# Patient Record
Sex: MALE | Race: WHITE | NOT HISPANIC OR LATINO
[De-identification: names, ages, dates, MRNs, and addresses within clinical notes are randomized per-mention and may not be internally consistent; named-entity substitution may affect disease eponyms.]

---

## 2018-03-20 ENCOUNTER — APPOINTMENT (OUTPATIENT)
Dept: ORTHOPEDIC SURGERY | Facility: CLINIC | Age: 63
End: 2018-03-20

## 2022-09-17 ENCOUNTER — APPOINTMENT (OUTPATIENT)
Dept: ORTHOPEDIC SURGERY | Facility: CLINIC | Age: 67
End: 2022-09-17

## 2022-12-23 ENCOUNTER — NON-APPOINTMENT (OUTPATIENT)
Age: 67
End: 2022-12-23

## 2022-12-29 ENCOUNTER — APPOINTMENT (OUTPATIENT)
Dept: ORTHOPEDIC SURGERY | Facility: CLINIC | Age: 67
End: 2022-12-29

## 2023-07-20 ENCOUNTER — APPOINTMENT (OUTPATIENT)
Dept: ORTHOPEDIC SURGERY | Facility: CLINIC | Age: 68
End: 2023-07-20
Payer: MEDICARE

## 2023-07-20 VITALS
WEIGHT: 220 LBS | HEIGHT: 72 IN | HEART RATE: 78 BPM | BODY MASS INDEX: 29.8 KG/M2 | DIASTOLIC BLOOD PRESSURE: 78 MMHG | SYSTOLIC BLOOD PRESSURE: 148 MMHG

## 2023-07-20 PROCEDURE — 73502 X-RAY EXAM HIP UNI 2-3 VIEWS: CPT

## 2023-07-20 PROCEDURE — 99214 OFFICE O/P EST MOD 30 MIN: CPT

## 2023-07-20 NOTE — REASON FOR VISIT
[Initial Visit] : an initial visit for [Hip Pain] : hip pain [Joint Replacement Surgery, Aftercare] : joint replacement surgery, aftercare

## 2023-07-24 ENCOUNTER — NON-APPOINTMENT (OUTPATIENT)
Age: 68
End: 2023-07-24

## 2023-07-24 RX ORDER — OXYCODONE 5 MG/1
5 TABLET ORAL
Qty: 30 | Refills: 0 | Status: ACTIVE | COMMUNITY
Start: 2023-07-24 | End: 1900-01-01

## 2023-07-24 NOTE — HISTORY OF PRESENT ILLNESS
[Worsening] : worsening [de-identified] : Patient is a 67-year-old male who presents today for an evaluation regarding his right hip.  He is status post right total hip replacement by Dr. Lee in 2009.  He states that following surgery he did very well without any complaints of any pain or discomfort.  However over the years he has noticed an increase in discomfort.  Particularly within the last several months.  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  And due to complications of the metal-on-metal implants.  Close monitoring as well as a possible need for revision may ensue.  He states that the pain is significant.  And is a 10 on a scale of 1-10 but most times at 8.  He states this is worse when laying on his side.  Or simply just ambulating.  He states that he does feel a stiffness and a discomfort.  On the lateral aspect but does radiate into the groin.  He denies any specific injury or accident.  States that he has tried Tylenol without any pain relief.

## 2023-07-24 NOTE — DISCUSSION/SUMMARY
[de-identified] : After thorough history full examination and review of x-rays.  Patient was explained that he does have a metal-on-metal implant with significant discomfort radiating into his groin.  Although he does have some lateral tenderness along the ITB/GT B.  Much of his discomfort radiates into the groin.  With further warrants further diagnostic testing.  This is a MRI to further evaluate the prosthesis as well as osteolysis and for any surrounding tissue.  He is also advised to receive blood work.  To rule out any infectious/inflammatory process as well as metal ions.  The blood work includes CBC with differential, BMP, sed rate, C-reactive protein, chromium cobalt and nickel ions.  He is advised to continue with conservative management as he is presently doing.  Including ice packs/moist heat and anti-inflammatories with some exercise routine.  But advised to return back to the office once the MRI and blood work is performed to discuss its findings and further medical management.\par \par 35 minutes were spent, face to face, in direct consultation with the patient. This includes reviewing the natural history of their Dx., eliciting the history, performing an orthopedic exam, review of the x-ray findings, forming a differential Dx and discussing all treatment options. This Includes both surgical and non-surgical treatments. I also reviewed all the risks and benefits of non-operative & operative Tx options, future impact into orthopedic functions/problems, activity restrictions both at home and at work, and all follow up requirements.\par \par \par

## 2023-07-24 NOTE — PHYSICAL EXAM
[Antalgic] : antalgic [LE] : Sensory: Intact in bilateral lower extremities [ALL] : dorsalis pedis, posterior tibial, femoral, popliteal, and radial 2+ and symmetric bilaterally [de-identified] : On physical examination of the right hip.  Patient is status post right total hip replacement from the posterior approach.  There is a well-healed surgical scars with no evidence of infection superficially deep.  There is no redness swelling heat or discharge noted.  However there is pain and tenderness on the lateral aspect of the hip which does radiate into the groin.  This is worse with any attempts of range of motion in any planes.  Particularly more so with internal and external rotation.  There is no evidence of any muscle atrophy.  No evidence of any muscle motor or sensory deficit.  Patient has good distal pulses no calf tenderness.  No evidence of limb length discrepancy. [de-identified] : X-rays of the right hip reveal status post right total hip replacement with a metal-on-metal implant.  The hardware is in place and shows good alignment with fixation.  No evidence of any fracture or dislocation.

## 2023-07-28 RX ORDER — OXYCODONE 5 MG/1
5 TABLET ORAL
Qty: 30 | Refills: 0 | Status: ACTIVE | COMMUNITY
Start: 2023-07-28 | End: 1900-01-01

## 2023-08-16 RX ORDER — OXYCODONE 5 MG/1
5 TABLET ORAL
Qty: 30 | Refills: 0 | Status: ACTIVE | COMMUNITY
Start: 2023-08-16 | End: 1900-01-01

## 2023-09-06 RX ORDER — OXYCODONE 5 MG/1
5 TABLET ORAL
Qty: 28 | Refills: 0 | Status: ACTIVE | COMMUNITY
Start: 2023-08-07 | End: 1900-01-01

## 2023-09-19 RX ORDER — OXYCODONE 5 MG/1
5 TABLET ORAL
Qty: 21 | Refills: 0 | Status: ACTIVE | COMMUNITY
Start: 2023-09-19 | End: 1900-01-01

## 2023-09-29 RX ORDER — OXYCODONE 5 MG/1
5 TABLET ORAL
Qty: 16 | Refills: 0 | Status: ACTIVE | COMMUNITY
Start: 2023-09-29 | End: 1900-01-01

## 2023-10-10 ENCOUNTER — APPOINTMENT (OUTPATIENT)
Dept: ORTHOPEDIC SURGERY | Facility: CLINIC | Age: 68
End: 2023-10-10
Payer: MEDICARE

## 2023-10-10 PROCEDURE — 99214 OFFICE O/P EST MOD 30 MIN: CPT

## 2023-11-02 RX ORDER — OXYCODONE 5 MG/1
5 TABLET ORAL
Qty: 40 | Refills: 0 | Status: ACTIVE | COMMUNITY
Start: 2023-07-20 | End: 1900-01-01

## 2023-11-03 ENCOUNTER — APPOINTMENT (OUTPATIENT)
Dept: ULTRASOUND IMAGING | Facility: CLINIC | Age: 68
End: 2023-11-03
Payer: MEDICARE

## 2023-11-03 PROCEDURE — 20611 DRAIN/INJ JOINT/BURSA W/US: CPT | Mod: RT

## 2023-11-07 LAB
ANION GAP SERPL CALC-SCNC: 11 MMOL/L
BUN SERPL-MCNC: 35 MG/DL
CALCIUM SERPL-MCNC: 11.7 MG/DL
CHLORIDE SERPL-SCNC: 97 MMOL/L
CO2 SERPL-SCNC: 31 MMOL/L
COBALT: 7.5 UG/L
CREAT SERPL-MCNC: 1.96 MG/DL
CRP SERPL-MCNC: <3 MG/L
EGFR: 37 ML/MIN/1.73M2
ERYTHROCYTE [SEDIMENTATION RATE] IN BLOOD BY WESTERGREN METHOD: 12 MM/HR
GLUCOSE SERPL-MCNC: 94 MG/DL
NICKEL: 6.8 UG/L
POTASSIUM SERPL-SCNC: 4.6 MMOL/L
SODIUM SERPL-SCNC: 140 MMOL/L

## 2023-11-08 LAB — CR BLD-MCNC: 7.6 UG/L

## 2023-11-10 RX ORDER — OXYCODONE 5 MG/1
5 TABLET ORAL EVERY 4 HOURS
Qty: 42 | Refills: 0 | Status: ACTIVE | COMMUNITY
Start: 2023-10-10 | End: 1900-01-01

## 2023-11-14 ENCOUNTER — APPOINTMENT (OUTPATIENT)
Dept: ORTHOPEDIC SURGERY | Facility: CLINIC | Age: 68
End: 2023-11-14
Payer: MEDICARE

## 2023-11-14 VITALS — SYSTOLIC BLOOD PRESSURE: 159 MMHG | DIASTOLIC BLOOD PRESSURE: 89 MMHG | HEART RATE: 83 BPM

## 2023-11-14 DIAGNOSIS — T84.84XA PAIN DUE TO INTERNAL ORTHOPEDIC PROSTHETIC DEVICES, IMPLANTS AND GRAFTS, INITIAL ENCOUNTER: ICD-10-CM

## 2023-11-14 PROCEDURE — 99214 OFFICE O/P EST MOD 30 MIN: CPT

## 2023-11-27 RX ORDER — OXYCODONE 5 MG/1
5 TABLET ORAL
Qty: 30 | Refills: 0 | Status: ACTIVE | COMMUNITY
Start: 2023-11-15 | End: 1900-01-01

## 2023-11-29 ENCOUNTER — OUTPATIENT (OUTPATIENT)
Dept: OUTPATIENT SERVICES | Facility: HOSPITAL | Age: 68
LOS: 1 days | End: 2023-11-29
Payer: MEDICARE

## 2023-11-29 VITALS
RESPIRATION RATE: 16 BRPM | WEIGHT: 212.08 LBS | DIASTOLIC BLOOD PRESSURE: 90 MMHG | HEART RATE: 78 BPM | SYSTOLIC BLOOD PRESSURE: 151 MMHG | TEMPERATURE: 98 F | OXYGEN SATURATION: 100 % | HEIGHT: 71 IN

## 2023-11-29 DIAGNOSIS — Z96.641 PRESENCE OF RIGHT ARTIFICIAL HIP JOINT: ICD-10-CM

## 2023-11-29 DIAGNOSIS — Z96.641 PRESENCE OF RIGHT ARTIFICIAL HIP JOINT: Chronic | ICD-10-CM

## 2023-11-29 DIAGNOSIS — T84.84XA PAIN DUE TO INTERNAL ORTHOPEDIC PROSTHETIC DEVICES, IMPLANTS AND GRAFTS, INITIAL ENCOUNTER: ICD-10-CM

## 2023-11-29 DIAGNOSIS — Z01.818 ENCOUNTER FOR OTHER PREPROCEDURAL EXAMINATION: ICD-10-CM

## 2023-11-29 DIAGNOSIS — Z90.49 ACQUIRED ABSENCE OF OTHER SPECIFIED PARTS OF DIGESTIVE TRACT: Chronic | ICD-10-CM

## 2023-11-29 DIAGNOSIS — Z98.890 OTHER SPECIFIED POSTPROCEDURAL STATES: Chronic | ICD-10-CM

## 2023-11-29 LAB
ALBUMIN SERPL ELPH-MCNC: 4.4 G/DL — SIGNIFICANT CHANGE UP (ref 3.3–5)
ALBUMIN SERPL ELPH-MCNC: 4.4 G/DL — SIGNIFICANT CHANGE UP (ref 3.3–5)
ALP SERPL-CCNC: 39 U/L — SIGNIFICANT CHANGE UP (ref 30–120)
ALP SERPL-CCNC: 39 U/L — SIGNIFICANT CHANGE UP (ref 30–120)
ALT FLD-CCNC: 177 U/L — HIGH (ref 10–60)
ALT FLD-CCNC: 177 U/L — HIGH (ref 10–60)
ANION GAP SERPL CALC-SCNC: 9 MMOL/L — SIGNIFICANT CHANGE UP (ref 5–17)
ANION GAP SERPL CALC-SCNC: 9 MMOL/L — SIGNIFICANT CHANGE UP (ref 5–17)
APTT BLD: 32.9 SEC — SIGNIFICANT CHANGE UP (ref 24.5–35.6)
APTT BLD: 32.9 SEC — SIGNIFICANT CHANGE UP (ref 24.5–35.6)
AST SERPL-CCNC: 289 U/L — HIGH (ref 10–40)
AST SERPL-CCNC: 289 U/L — HIGH (ref 10–40)
BILIRUB SERPL-MCNC: 1.2 MG/DL — SIGNIFICANT CHANGE UP (ref 0.2–1.2)
BILIRUB SERPL-MCNC: 1.2 MG/DL — SIGNIFICANT CHANGE UP (ref 0.2–1.2)
BLD GP AB SCN SERPL QL: SIGNIFICANT CHANGE UP
BLD GP AB SCN SERPL QL: SIGNIFICANT CHANGE UP
BUN SERPL-MCNC: 30 MG/DL — HIGH (ref 7–23)
BUN SERPL-MCNC: 30 MG/DL — HIGH (ref 7–23)
CALCIUM SERPL-MCNC: 9.3 MG/DL — SIGNIFICANT CHANGE UP (ref 8.4–10.5)
CALCIUM SERPL-MCNC: 9.3 MG/DL — SIGNIFICANT CHANGE UP (ref 8.4–10.5)
CHLORIDE SERPL-SCNC: 95 MMOL/L — LOW (ref 96–108)
CHLORIDE SERPL-SCNC: 95 MMOL/L — LOW (ref 96–108)
CO2 SERPL-SCNC: 28 MMOL/L — SIGNIFICANT CHANGE UP (ref 22–31)
CO2 SERPL-SCNC: 28 MMOL/L — SIGNIFICANT CHANGE UP (ref 22–31)
CREAT SERPL-MCNC: 1.51 MG/DL — HIGH (ref 0.5–1.3)
CREAT SERPL-MCNC: 1.51 MG/DL — HIGH (ref 0.5–1.3)
EGFR: 51 ML/MIN/1.73M2 — LOW
EGFR: 51 ML/MIN/1.73M2 — LOW
GLUCOSE SERPL-MCNC: 124 MG/DL — HIGH (ref 70–99)
GLUCOSE SERPL-MCNC: 124 MG/DL — HIGH (ref 70–99)
HCT VFR BLD CALC: 38.7 % — LOW (ref 39–50)
HCT VFR BLD CALC: 38.7 % — LOW (ref 39–50)
HGB BLD-MCNC: 12.8 G/DL — LOW (ref 13–17)
HGB BLD-MCNC: 12.8 G/DL — LOW (ref 13–17)
INR BLD: 1.26 RATIO — HIGH (ref 0.85–1.18)
INR BLD: 1.26 RATIO — HIGH (ref 0.85–1.18)
MCHC RBC-ENTMCNC: 30.5 PG — SIGNIFICANT CHANGE UP (ref 27–34)
MCHC RBC-ENTMCNC: 30.5 PG — SIGNIFICANT CHANGE UP (ref 27–34)
MCHC RBC-ENTMCNC: 33.1 GM/DL — SIGNIFICANT CHANGE UP (ref 32–36)
MCHC RBC-ENTMCNC: 33.1 GM/DL — SIGNIFICANT CHANGE UP (ref 32–36)
MCV RBC AUTO: 92.4 FL — SIGNIFICANT CHANGE UP (ref 80–100)
MCV RBC AUTO: 92.4 FL — SIGNIFICANT CHANGE UP (ref 80–100)
NRBC # BLD: 0 /100 WBCS — SIGNIFICANT CHANGE UP (ref 0–0)
NRBC # BLD: 0 /100 WBCS — SIGNIFICANT CHANGE UP (ref 0–0)
PLATELET # BLD AUTO: 268 K/UL — SIGNIFICANT CHANGE UP (ref 150–400)
PLATELET # BLD AUTO: 268 K/UL — SIGNIFICANT CHANGE UP (ref 150–400)
POTASSIUM SERPL-MCNC: 4.5 MMOL/L — SIGNIFICANT CHANGE UP (ref 3.5–5.3)
POTASSIUM SERPL-MCNC: 4.5 MMOL/L — SIGNIFICANT CHANGE UP (ref 3.5–5.3)
POTASSIUM SERPL-SCNC: 4.5 MMOL/L — SIGNIFICANT CHANGE UP (ref 3.5–5.3)
POTASSIUM SERPL-SCNC: 4.5 MMOL/L — SIGNIFICANT CHANGE UP (ref 3.5–5.3)
PROT SERPL-MCNC: 7.9 G/DL — SIGNIFICANT CHANGE UP (ref 6–8.3)
PROT SERPL-MCNC: 7.9 G/DL — SIGNIFICANT CHANGE UP (ref 6–8.3)
PROTHROM AB SERPL-ACNC: 14 SEC — HIGH (ref 9.5–13)
PROTHROM AB SERPL-ACNC: 14 SEC — HIGH (ref 9.5–13)
RBC # BLD: 4.19 M/UL — LOW (ref 4.2–5.8)
RBC # BLD: 4.19 M/UL — LOW (ref 4.2–5.8)
RBC # FLD: 13.2 % — SIGNIFICANT CHANGE UP (ref 10.3–14.5)
RBC # FLD: 13.2 % — SIGNIFICANT CHANGE UP (ref 10.3–14.5)
SODIUM SERPL-SCNC: 132 MMOL/L — LOW (ref 135–145)
SODIUM SERPL-SCNC: 132 MMOL/L — LOW (ref 135–145)
WBC # BLD: 7.12 K/UL — SIGNIFICANT CHANGE UP (ref 3.8–10.5)
WBC # BLD: 7.12 K/UL — SIGNIFICANT CHANGE UP (ref 3.8–10.5)
WBC # FLD AUTO: 7.12 K/UL — SIGNIFICANT CHANGE UP (ref 3.8–10.5)
WBC # FLD AUTO: 7.12 K/UL — SIGNIFICANT CHANGE UP (ref 3.8–10.5)

## 2023-11-29 PROCEDURE — G0463: CPT

## 2023-11-29 NOTE — H&P PST ADULT - NSICDXPROCEDURE_GEN_ALL_CORE_FT
PROCEDURES:  First stage revision of total arthroplasty of right hip 29-Nov-2023 15:01:50  Cyndi Barber

## 2023-11-29 NOTE — H&P PST ADULT - NSICDXFAMILYHX_GEN_ALL_CORE_FT
FAMILY HISTORY:  Father  Still living? No  Family history of hyperlipidemia, Age at diagnosis: Age Unknown

## 2023-11-29 NOTE — H&P PST ADULT - NSICDXPASTMEDICALHX_GEN_ALL_CORE_FT
PAST MEDICAL HISTORY:  Hyperlipidemia     Hypertension     Osteoarthritis of right hip     Painful orthopaedic hardware      PAST MEDICAL HISTORY:  Hyperlipidemia     Hypertension     Osteoarthritis of right hip     Painful orthopaedic hardware     Syncopal episodes

## 2023-11-29 NOTE — H&P PST ADULT - NSICDXPASTSURGICALHX_GEN_ALL_CORE_FT
PAST SURGICAL HISTORY:  S/P appendectomy     S/P hernia repair     Status post total replacement of right hip

## 2023-11-29 NOTE — H&P PST ADULT - NEUROLOGICAL COMMENTS
r/t dehydration due to renal disease (last episode was in early 2022) syncopal episodes r/t dehydration (last episode was in early 2022)

## 2023-11-29 NOTE — H&P PST ADULT - ASSESSMENT
68yo male patient scheduled for Revision of RIGHT Total Hip Replacement on 12/11/2023.    66yo male patient scheduled for Revision of RIGHT Total Hip Replacement on 12/11/2023.

## 2023-11-29 NOTE — H&P PST ADULT - HISTORY OF PRESENT ILLNESS
66yo male patient who states that he has Right Total Hip Replacement in 2006. He has been unable to lie on his right side since that time. He has had increased pain since the summer. He has had arthrocentesis several weeks ago, but states that pain is increased since that time. He rates the pain at 9/10 and he is taking Oxycodone and Tylenol prn with minimal relief. Revision has been recommended and he presents today for PSTs.  68yo male patient who states that he has Right Total Hip Replacement in 2006. He has been unable to lie on his right side since that time. He has had increased pain since the summer. He has had arthrocentesis several weeks ago, but states that pain is increased since that time. He rates the pain at 9/10 and he is taking Oxycodone or Tylenol prn with minimal relief. Revision has been recommended and he presents today for PSTs.  66yo male patient who states that he has Right Total Hip Replacement in 2006. He has been unable to lie on his right side since that time. He has had increased pain since the summer. He has had arthrocentesis several weeks ago, but states that pain is increased since that time. He rates the pain at 9/10 and he is taking Oxycodone or Tylenol prn with minimal relief. Revision has been recommended and he presents today for PSTs.

## 2023-11-29 NOTE — H&P PST ADULT - PROBLEM SELECTOR PLAN 1
Single Stage Revision of RIGHT Total Hip Replacement on 12/11/2023.   Medical Clearance by PMD  NPO as per Anesthesia- no meds on AM of surgery  Tylenol or Oxycodone prn if needed   Instructions reviewed and questions addressed  Pt denies any metal allergies.

## 2023-11-30 LAB
A1C WITH ESTIMATED AVERAGE GLUCOSE RESULT: 5.4 % — SIGNIFICANT CHANGE UP (ref 4–5.6)
A1C WITH ESTIMATED AVERAGE GLUCOSE RESULT: 5.4 % — SIGNIFICANT CHANGE UP (ref 4–5.6)
ESTIMATED AVERAGE GLUCOSE: 108 MG/DL — SIGNIFICANT CHANGE UP (ref 68–114)
ESTIMATED AVERAGE GLUCOSE: 108 MG/DL — SIGNIFICANT CHANGE UP (ref 68–114)
MRSA PCR RESULT.: SIGNIFICANT CHANGE UP
MRSA PCR RESULT.: SIGNIFICANT CHANGE UP
S AUREUS DNA NOSE QL NAA+PROBE: DETECTED
S AUREUS DNA NOSE QL NAA+PROBE: DETECTED

## 2023-11-30 RX ORDER — MUPIROCIN 20 MG/G
1 OINTMENT TOPICAL
Qty: 1 | Refills: 0
Start: 2023-11-30 | End: 2023-12-04

## 2023-12-04 RX ORDER — OXYCODONE 5 MG/1
5 TABLET ORAL
Qty: 42 | Refills: 0 | Status: ACTIVE | COMMUNITY
Start: 2023-12-04 | End: 1900-01-01

## 2023-12-05 PROBLEM — I10 ESSENTIAL (PRIMARY) HYPERTENSION: Chronic | Status: ACTIVE | Noted: 2023-11-29

## 2023-12-05 PROBLEM — R55 SYNCOPE AND COLLAPSE: Chronic | Status: ACTIVE | Noted: 2023-11-29

## 2023-12-05 PROBLEM — T84.84XA PAIN DUE TO INTERNAL ORTHOPEDIC PROSTHETIC DEVICES, IMPLANTS AND GRAFTS, INITIAL ENCOUNTER: Chronic | Status: ACTIVE | Noted: 2023-11-29

## 2023-12-05 PROBLEM — M16.11 UNILATERAL PRIMARY OSTEOARTHRITIS, RIGHT HIP: Chronic | Status: ACTIVE | Noted: 2023-11-29

## 2023-12-05 PROBLEM — E78.5 HYPERLIPIDEMIA, UNSPECIFIED: Chronic | Status: ACTIVE | Noted: 2023-11-29

## 2023-12-10 ENCOUNTER — TRANSCRIPTION ENCOUNTER (OUTPATIENT)
Age: 68
End: 2023-12-10

## 2023-12-11 ENCOUNTER — RESULT REVIEW (OUTPATIENT)
Age: 68
End: 2023-12-11

## 2023-12-11 ENCOUNTER — APPOINTMENT (OUTPATIENT)
Dept: ORTHOPEDIC SURGERY | Facility: HOSPITAL | Age: 68
End: 2023-12-11

## 2023-12-11 ENCOUNTER — INPATIENT (INPATIENT)
Facility: HOSPITAL | Age: 68
LOS: 3 days | Discharge: HOME | DRG: 467 | End: 2023-12-15
Attending: ORTHOPAEDIC SURGERY | Admitting: ORTHOPAEDIC SURGERY
Payer: MEDICARE

## 2023-12-11 ENCOUNTER — TRANSCRIPTION ENCOUNTER (OUTPATIENT)
Age: 68
End: 2023-12-11

## 2023-12-11 VITALS
RESPIRATION RATE: 13 BRPM | WEIGHT: 212.53 LBS | HEIGHT: 72 IN | OXYGEN SATURATION: 100 % | DIASTOLIC BLOOD PRESSURE: 74 MMHG | TEMPERATURE: 97 F | HEART RATE: 82 BPM | SYSTOLIC BLOOD PRESSURE: 146 MMHG

## 2023-12-11 DIAGNOSIS — T84.84XA PAIN DUE TO INTERNAL ORTHOPEDIC PROSTHETIC DEVICES, IMPLANTS AND GRAFTS, INITIAL ENCOUNTER: ICD-10-CM

## 2023-12-11 DIAGNOSIS — Z96.641 PRESENCE OF RIGHT ARTIFICIAL HIP JOINT: ICD-10-CM

## 2023-12-11 DIAGNOSIS — Z96.641 PRESENCE OF RIGHT ARTIFICIAL HIP JOINT: Chronic | ICD-10-CM

## 2023-12-11 DIAGNOSIS — Z98.890 OTHER SPECIFIED POSTPROCEDURAL STATES: Chronic | ICD-10-CM

## 2023-12-11 DIAGNOSIS — Z90.49 ACQUIRED ABSENCE OF OTHER SPECIFIED PARTS OF DIGESTIVE TRACT: Chronic | ICD-10-CM

## 2023-12-11 LAB
APTT BLD: 32.5 SEC — SIGNIFICANT CHANGE UP (ref 24.5–35.6)
APTT BLD: 32.5 SEC — SIGNIFICANT CHANGE UP (ref 24.5–35.6)
HCT VFR BLD CALC: 27.9 % — LOW (ref 39–50)
HCT VFR BLD CALC: 27.9 % — LOW (ref 39–50)
HGB BLD-MCNC: 9 G/DL — LOW (ref 13–17)
HGB BLD-MCNC: 9 G/DL — LOW (ref 13–17)
INR BLD: 1.06 RATIO — SIGNIFICANT CHANGE UP (ref 0.85–1.18)
INR BLD: 1.06 RATIO — SIGNIFICANT CHANGE UP (ref 0.85–1.18)
MCHC RBC-ENTMCNC: 31.5 PG — SIGNIFICANT CHANGE UP (ref 27–34)
MCHC RBC-ENTMCNC: 31.5 PG — SIGNIFICANT CHANGE UP (ref 27–34)
MCHC RBC-ENTMCNC: 32.3 GM/DL — SIGNIFICANT CHANGE UP (ref 32–36)
MCHC RBC-ENTMCNC: 32.3 GM/DL — SIGNIFICANT CHANGE UP (ref 32–36)
MCV RBC AUTO: 97.6 FL — SIGNIFICANT CHANGE UP (ref 80–100)
MCV RBC AUTO: 97.6 FL — SIGNIFICANT CHANGE UP (ref 80–100)
NRBC # BLD: 0 /100 WBCS — SIGNIFICANT CHANGE UP (ref 0–0)
NRBC # BLD: 0 /100 WBCS — SIGNIFICANT CHANGE UP (ref 0–0)
PLATELET # BLD AUTO: 226 K/UL — SIGNIFICANT CHANGE UP (ref 150–400)
PLATELET # BLD AUTO: 226 K/UL — SIGNIFICANT CHANGE UP (ref 150–400)
PROTHROM AB SERPL-ACNC: 11.8 SEC — SIGNIFICANT CHANGE UP (ref 9.5–13)
PROTHROM AB SERPL-ACNC: 11.8 SEC — SIGNIFICANT CHANGE UP (ref 9.5–13)
RBC # BLD: 2.86 M/UL — LOW (ref 4.2–5.8)
RBC # BLD: 2.86 M/UL — LOW (ref 4.2–5.8)
RBC # FLD: 13.2 % — SIGNIFICANT CHANGE UP (ref 10.3–14.5)
RBC # FLD: 13.2 % — SIGNIFICANT CHANGE UP (ref 10.3–14.5)
WBC # BLD: 7.83 K/UL — SIGNIFICANT CHANGE UP (ref 3.8–10.5)
WBC # BLD: 7.83 K/UL — SIGNIFICANT CHANGE UP (ref 3.8–10.5)
WBC # FLD AUTO: 7.83 K/UL — SIGNIFICANT CHANGE UP (ref 3.8–10.5)
WBC # FLD AUTO: 7.83 K/UL — SIGNIFICANT CHANGE UP (ref 3.8–10.5)

## 2023-12-11 PROCEDURE — 73502 X-RAY EXAM HIP UNI 2-3 VIEWS: CPT | Mod: 26,RT

## 2023-12-11 PROCEDURE — 88300 SURGICAL PATH GROSS: CPT | Mod: 26,59

## 2023-12-11 PROCEDURE — 27134 REVISE HIP JOINT REPLACEMENT: CPT | Mod: RT

## 2023-12-11 PROCEDURE — 88311 DECALCIFY TISSUE: CPT | Mod: 26

## 2023-12-11 PROCEDURE — 88305 TISSUE EXAM BY PATHOLOGIST: CPT | Mod: 26

## 2023-12-11 DEVICE — IMPLANTABLE DEVICE: Type: IMPLANTABLE DEVICE | Status: FUNCTIONAL

## 2023-12-11 DEVICE — HEAD BIOLOX DELTA 28MM: Type: IMPLANTABLE DEVICE | Status: FUNCTIONAL

## 2023-12-11 DEVICE — INSERT FEM BIOLOX TAPER ADAPTER PLUS 3MM: Type: IMPLANTABLE DEVICE | Status: FUNCTIONAL

## 2023-12-11 DEVICE — BONE FILLER BIOCOMPOSITE STIMULAN RAPID CURE 10CC: Type: IMPLANTABLE DEVICE | Status: FUNCTIONAL

## 2023-12-11 RX ORDER — CHLORHEXIDINE GLUCONATE 213 G/1000ML
1 SOLUTION TOPICAL ONCE
Refills: 0 | Status: COMPLETED | OUTPATIENT
Start: 2023-12-11 | End: 2023-12-11

## 2023-12-11 RX ORDER — ZOLPIDEM TARTRATE 10 MG/1
5 TABLET ORAL AT BEDTIME
Refills: 0 | Status: DISCONTINUED | OUTPATIENT
Start: 2023-12-11 | End: 2023-12-15

## 2023-12-11 RX ORDER — ROSUVASTATIN CALCIUM 5 MG/1
1 TABLET ORAL
Refills: 0 | DISCHARGE

## 2023-12-11 RX ORDER — SENNA PLUS 8.6 MG/1
2 TABLET ORAL AT BEDTIME
Refills: 0 | Status: DISCONTINUED | OUTPATIENT
Start: 2023-12-11 | End: 2023-12-15

## 2023-12-11 RX ORDER — OXYCODONE HYDROCHLORIDE 5 MG/1
10 TABLET ORAL
Refills: 0 | Status: DISCONTINUED | OUTPATIENT
Start: 2023-12-11 | End: 2023-12-13

## 2023-12-11 RX ORDER — OXYCODONE HYDROCHLORIDE 5 MG/1
15 TABLET ORAL
Refills: 0 | Status: DISCONTINUED | OUTPATIENT
Start: 2023-12-11 | End: 2023-12-13

## 2023-12-11 RX ORDER — ONDANSETRON 8 MG/1
4 TABLET, FILM COATED ORAL EVERY 6 HOURS
Refills: 0 | Status: DISCONTINUED | OUTPATIENT
Start: 2023-12-11 | End: 2023-12-15

## 2023-12-11 RX ORDER — FENOFIBRATE,MICRONIZED 130 MG
145 CAPSULE ORAL AT BEDTIME
Refills: 0 | Status: DISCONTINUED | OUTPATIENT
Start: 2023-12-11 | End: 2023-12-15

## 2023-12-11 RX ORDER — SODIUM CHLORIDE 9 MG/ML
500 INJECTION INTRAMUSCULAR; INTRAVENOUS; SUBCUTANEOUS ONCE
Refills: 0 | Status: COMPLETED | OUTPATIENT
Start: 2023-12-11 | End: 2023-12-11

## 2023-12-11 RX ORDER — APREPITANT 80 MG/1
40 CAPSULE ORAL ONCE
Refills: 0 | Status: COMPLETED | OUTPATIENT
Start: 2023-12-11 | End: 2023-12-11

## 2023-12-11 RX ORDER — DEXAMETHASONE 0.5 MG/5ML
8 ELIXIR ORAL ONCE
Refills: 0 | Status: COMPLETED | OUTPATIENT
Start: 2023-12-12 | End: 2023-12-12

## 2023-12-11 RX ORDER — ACETAMINOPHEN 500 MG
500 TABLET ORAL ONCE
Refills: 0 | Status: COMPLETED | OUTPATIENT
Start: 2023-12-11 | End: 2023-12-11

## 2023-12-11 RX ORDER — HYDROMORPHONE HYDROCHLORIDE 2 MG/ML
0.5 INJECTION INTRAMUSCULAR; INTRAVENOUS; SUBCUTANEOUS
Refills: 0 | Status: DISCONTINUED | OUTPATIENT
Start: 2023-12-11 | End: 2023-12-15

## 2023-12-11 RX ORDER — CELECOXIB 200 MG/1
100 CAPSULE ORAL EVERY 12 HOURS
Refills: 0 | Status: DISCONTINUED | OUTPATIENT
Start: 2023-12-11 | End: 2023-12-12

## 2023-12-11 RX ORDER — SODIUM CHLORIDE 9 MG/ML
1000 INJECTION, SOLUTION INTRAVENOUS
Refills: 0 | Status: DISCONTINUED | OUTPATIENT
Start: 2023-12-12 | End: 2023-12-15

## 2023-12-11 RX ORDER — ACETAMINOPHEN 500 MG
500 TABLET ORAL EVERY 8 HOURS
Refills: 0 | Status: DISCONTINUED | OUTPATIENT
Start: 2023-12-12 | End: 2023-12-15

## 2023-12-11 RX ORDER — MAGNESIUM HYDROXIDE 400 MG/1
30 TABLET, CHEWABLE ORAL DAILY
Refills: 0 | Status: DISCONTINUED | OUTPATIENT
Start: 2023-12-11 | End: 2023-12-15

## 2023-12-11 RX ORDER — OXYMETAZOLINE HYDROCHLORIDE 0.5 MG/ML
1 SPRAY NASAL
Refills: 0 | DISCHARGE

## 2023-12-11 RX ORDER — VALSARTAN 80 MG/1
160 TABLET ORAL DAILY
Refills: 0 | Status: DISCONTINUED | OUTPATIENT
Start: 2023-12-13 | End: 2023-12-15

## 2023-12-11 RX ORDER — ATORVASTATIN CALCIUM 80 MG/1
80 TABLET, FILM COATED ORAL AT BEDTIME
Refills: 0 | Status: DISCONTINUED | OUTPATIENT
Start: 2023-12-11 | End: 2023-12-12

## 2023-12-11 RX ORDER — FENOFIBRATE,MICRONIZED 130 MG
1 CAPSULE ORAL
Refills: 0 | DISCHARGE

## 2023-12-11 RX ORDER — ASPIRIN/CALCIUM CARB/MAGNESIUM 324 MG
81 TABLET ORAL EVERY 12 HOURS
Refills: 0 | Status: DISCONTINUED | OUTPATIENT
Start: 2023-12-12 | End: 2023-12-12

## 2023-12-11 RX ORDER — TRANEXAMIC ACID 100 MG/ML
1000 INJECTION, SOLUTION INTRAVENOUS ONCE
Refills: 0 | Status: COMPLETED | OUTPATIENT
Start: 2023-12-11 | End: 2023-12-11

## 2023-12-11 RX ORDER — GABAPENTIN 400 MG/1
1 CAPSULE ORAL
Refills: 0 | DISCHARGE

## 2023-12-11 RX ORDER — GABAPENTIN 400 MG/1
300 CAPSULE ORAL THREE TIMES A DAY
Refills: 0 | Status: DISCONTINUED | OUTPATIENT
Start: 2023-12-11 | End: 2023-12-15

## 2023-12-11 RX ORDER — HYDROMORPHONE HYDROCHLORIDE 2 MG/ML
1 INJECTION INTRAMUSCULAR; INTRAVENOUS; SUBCUTANEOUS
Refills: 0 | Status: DISCONTINUED | OUTPATIENT
Start: 2023-12-11 | End: 2023-12-11

## 2023-12-11 RX ORDER — CEFAZOLIN SODIUM 1 G
2000 VIAL (EA) INJECTION ONCE
Refills: 0 | Status: COMPLETED | OUTPATIENT
Start: 2023-12-11 | End: 2023-12-11

## 2023-12-11 RX ORDER — HYDROMORPHONE HYDROCHLORIDE 2 MG/ML
0.5 INJECTION INTRAMUSCULAR; INTRAVENOUS; SUBCUTANEOUS
Refills: 0 | Status: DISCONTINUED | OUTPATIENT
Start: 2023-12-11 | End: 2023-12-11

## 2023-12-11 RX ORDER — POLYETHYLENE GLYCOL 3350 17 G/17G
17 POWDER, FOR SOLUTION ORAL AT BEDTIME
Refills: 0 | Status: DISCONTINUED | OUTPATIENT
Start: 2023-12-11 | End: 2023-12-15

## 2023-12-11 RX ORDER — PANTOPRAZOLE SODIUM 20 MG/1
40 TABLET, DELAYED RELEASE ORAL
Refills: 0 | Status: DISCONTINUED | OUTPATIENT
Start: 2023-12-11 | End: 2023-12-15

## 2023-12-11 RX ORDER — CEFAZOLIN SODIUM 1 G
2000 VIAL (EA) INJECTION EVERY 8 HOURS
Refills: 0 | Status: COMPLETED | OUTPATIENT
Start: 2023-12-11 | End: 2023-12-12

## 2023-12-11 RX ORDER — SODIUM CHLORIDE 9 MG/ML
1000 INJECTION, SOLUTION INTRAVENOUS
Refills: 0 | Status: DISCONTINUED | OUTPATIENT
Start: 2023-12-11 | End: 2023-12-11

## 2023-12-11 RX ADMIN — Medication 200 MILLIGRAM(S): at 22:42

## 2023-12-11 RX ADMIN — SENNA PLUS 2 TABLET(S): 8.6 TABLET ORAL at 21:57

## 2023-12-11 RX ADMIN — SODIUM CHLORIDE 75 MILLILITER(S): 9 INJECTION, SOLUTION INTRAVENOUS at 16:43

## 2023-12-11 RX ADMIN — OXYCODONE HYDROCHLORIDE 15 MILLIGRAM(S): 5 TABLET ORAL at 23:12

## 2023-12-11 RX ADMIN — SODIUM CHLORIDE 500 MILLILITER(S): 9 INJECTION INTRAMUSCULAR; INTRAVENOUS; SUBCUTANEOUS at 16:43

## 2023-12-11 RX ADMIN — HYDROMORPHONE HYDROCHLORIDE 1 MILLIGRAM(S): 2 INJECTION INTRAMUSCULAR; INTRAVENOUS; SUBCUTANEOUS at 19:47

## 2023-12-11 RX ADMIN — CHLORHEXIDINE GLUCONATE 1 APPLICATION(S): 213 SOLUTION TOPICAL at 11:35

## 2023-12-11 RX ADMIN — SODIUM CHLORIDE 500 MILLILITER(S): 9 INJECTION INTRAMUSCULAR; INTRAVENOUS; SUBCUTANEOUS at 23:14

## 2023-12-11 RX ADMIN — APREPITANT 40 MILLIGRAM(S): 80 CAPSULE ORAL at 11:34

## 2023-12-11 RX ADMIN — Medication 500 MILLIGRAM(S): at 22:51

## 2023-12-11 RX ADMIN — HYDROMORPHONE HYDROCHLORIDE 1 MILLIGRAM(S): 2 INJECTION INTRAMUSCULAR; INTRAVENOUS; SUBCUTANEOUS at 19:13

## 2023-12-11 RX ADMIN — SODIUM CHLORIDE 75 MILLILITER(S): 9 INJECTION, SOLUTION INTRAVENOUS at 20:48

## 2023-12-11 RX ADMIN — Medication 145 MILLIGRAM(S): at 21:57

## 2023-12-11 RX ADMIN — GABAPENTIN 300 MILLIGRAM(S): 400 CAPSULE ORAL at 21:57

## 2023-12-11 RX ADMIN — Medication 100 MILLIGRAM(S): at 20:14

## 2023-12-11 RX ADMIN — ZOLPIDEM TARTRATE 5 MILLIGRAM(S): 10 TABLET ORAL at 23:01

## 2023-12-11 RX ADMIN — HYDROMORPHONE HYDROCHLORIDE 1 MILLIGRAM(S): 2 INJECTION INTRAMUSCULAR; INTRAVENOUS; SUBCUTANEOUS at 16:32

## 2023-12-11 RX ADMIN — OXYCODONE HYDROCHLORIDE 15 MILLIGRAM(S): 5 TABLET ORAL at 22:28

## 2023-12-11 RX ADMIN — HYDROMORPHONE HYDROCHLORIDE 1 MILLIGRAM(S): 2 INJECTION INTRAMUSCULAR; INTRAVENOUS; SUBCUTANEOUS at 17:44

## 2023-12-11 RX ADMIN — HYDROMORPHONE HYDROCHLORIDE 1 MILLIGRAM(S): 2 INJECTION INTRAMUSCULAR; INTRAVENOUS; SUBCUTANEOUS at 20:53

## 2023-12-11 RX ADMIN — HYDROMORPHONE HYDROCHLORIDE 1 MILLIGRAM(S): 2 INJECTION INTRAMUSCULAR; INTRAVENOUS; SUBCUTANEOUS at 18:00

## 2023-12-11 RX ADMIN — HYDROMORPHONE HYDROCHLORIDE 1 MILLIGRAM(S): 2 INJECTION INTRAMUSCULAR; INTRAVENOUS; SUBCUTANEOUS at 19:50

## 2023-12-11 NOTE — PATIENT PROFILE ADULT - FALL HARM RISK - UNIVERSAL INTERVENTIONS
Bed in lowest position, wheels locked, appropriate side rails in place/Call bell, personal items and telephone in reach/Instruct patient to call for assistance before getting out of bed or chair/Non-slip footwear when patient is out of bed/[place] to call system/Physically safe environment - no spills, clutter or unnecessary equipment/Purposeful Proactive Rounding/Room/bathroom lighting operational, light cord in reach Bed in lowest position, wheels locked, appropriate side rails in place/Call bell, personal items and telephone in reach/Instruct patient to call for assistance before getting out of bed or chair/Non-slip footwear when patient is out of bed/[place] to call system/Physically safe environment - no spills, clutter or unnecessary equipment/Purposeful Proactive Rounding/Room/bathroom lighting operational, light cord in reach

## 2023-12-11 NOTE — BRIEF OPERATIVE NOTE - NSICDXBRIEFPROCEDURE_GEN_ALL_CORE_FT
PROCEDURES:  Partial hip revision, acetabular component only 11-Dec-2023 16:26:42 right Darwin Mckay

## 2023-12-12 ENCOUNTER — TRANSCRIPTION ENCOUNTER (OUTPATIENT)
Age: 68
End: 2023-12-12

## 2023-12-12 LAB
ANION GAP SERPL CALC-SCNC: 6 MMOL/L — SIGNIFICANT CHANGE UP (ref 5–17)
ANION GAP SERPL CALC-SCNC: 6 MMOL/L — SIGNIFICANT CHANGE UP (ref 5–17)
BUN SERPL-MCNC: 31 MG/DL — HIGH (ref 7–23)
BUN SERPL-MCNC: 31 MG/DL — HIGH (ref 7–23)
CALCIUM SERPL-MCNC: 8.7 MG/DL — SIGNIFICANT CHANGE UP (ref 8.4–10.5)
CALCIUM SERPL-MCNC: 8.7 MG/DL — SIGNIFICANT CHANGE UP (ref 8.4–10.5)
CHLORIDE SERPL-SCNC: 102 MMOL/L — SIGNIFICANT CHANGE UP (ref 96–108)
CHLORIDE SERPL-SCNC: 102 MMOL/L — SIGNIFICANT CHANGE UP (ref 96–108)
CO2 SERPL-SCNC: 27 MMOL/L — SIGNIFICANT CHANGE UP (ref 22–31)
CO2 SERPL-SCNC: 27 MMOL/L — SIGNIFICANT CHANGE UP (ref 22–31)
CREAT SERPL-MCNC: 1.68 MG/DL — HIGH (ref 0.5–1.3)
CREAT SERPL-MCNC: 1.68 MG/DL — HIGH (ref 0.5–1.3)
EGFR: 44 ML/MIN/1.73M2 — LOW
EGFR: 44 ML/MIN/1.73M2 — LOW
GLUCOSE SERPL-MCNC: 120 MG/DL — HIGH (ref 70–99)
GLUCOSE SERPL-MCNC: 120 MG/DL — HIGH (ref 70–99)
GRAM STN FLD: SIGNIFICANT CHANGE UP
HCT VFR BLD CALC: 22.7 % — LOW (ref 39–50)
HCT VFR BLD CALC: 22.7 % — LOW (ref 39–50)
HGB BLD-MCNC: 7.2 G/DL — LOW (ref 13–17)
HGB BLD-MCNC: 7.2 G/DL — LOW (ref 13–17)
MCHC RBC-ENTMCNC: 30.4 PG — SIGNIFICANT CHANGE UP (ref 27–34)
MCHC RBC-ENTMCNC: 30.4 PG — SIGNIFICANT CHANGE UP (ref 27–34)
MCHC RBC-ENTMCNC: 31.7 GM/DL — LOW (ref 32–36)
MCHC RBC-ENTMCNC: 31.7 GM/DL — LOW (ref 32–36)
MCV RBC AUTO: 95.8 FL — SIGNIFICANT CHANGE UP (ref 80–100)
MCV RBC AUTO: 95.8 FL — SIGNIFICANT CHANGE UP (ref 80–100)
NRBC # BLD: 0 /100 WBCS — SIGNIFICANT CHANGE UP (ref 0–0)
NRBC # BLD: 0 /100 WBCS — SIGNIFICANT CHANGE UP (ref 0–0)
PLATELET # BLD AUTO: 179 K/UL — SIGNIFICANT CHANGE UP (ref 150–400)
PLATELET # BLD AUTO: 179 K/UL — SIGNIFICANT CHANGE UP (ref 150–400)
POTASSIUM SERPL-MCNC: 4.5 MMOL/L — SIGNIFICANT CHANGE UP (ref 3.5–5.3)
POTASSIUM SERPL-MCNC: 4.5 MMOL/L — SIGNIFICANT CHANGE UP (ref 3.5–5.3)
POTASSIUM SERPL-SCNC: 4.5 MMOL/L — SIGNIFICANT CHANGE UP (ref 3.5–5.3)
POTASSIUM SERPL-SCNC: 4.5 MMOL/L — SIGNIFICANT CHANGE UP (ref 3.5–5.3)
RBC # BLD: 2.37 M/UL — LOW (ref 4.2–5.8)
RBC # BLD: 2.37 M/UL — LOW (ref 4.2–5.8)
RBC # FLD: 14.7 % — HIGH (ref 10.3–14.5)
RBC # FLD: 14.7 % — HIGH (ref 10.3–14.5)
SODIUM SERPL-SCNC: 135 MMOL/L — SIGNIFICANT CHANGE UP (ref 135–145)
SODIUM SERPL-SCNC: 135 MMOL/L — SIGNIFICANT CHANGE UP (ref 135–145)
SPECIMEN SOURCE: SIGNIFICANT CHANGE UP
WBC # BLD: 6.98 K/UL — SIGNIFICANT CHANGE UP (ref 3.8–10.5)
WBC # BLD: 6.98 K/UL — SIGNIFICANT CHANGE UP (ref 3.8–10.5)
WBC # FLD AUTO: 6.98 K/UL — SIGNIFICANT CHANGE UP (ref 3.8–10.5)
WBC # FLD AUTO: 6.98 K/UL — SIGNIFICANT CHANGE UP (ref 3.8–10.5)

## 2023-12-12 PROCEDURE — 99222 1ST HOSP IP/OBS MODERATE 55: CPT

## 2023-12-12 RX ORDER — CEFAZOLIN SODIUM 1 G
2000 VIAL (EA) INJECTION EVERY 8 HOURS
Refills: 0 | Status: COMPLETED | OUTPATIENT
Start: 2023-12-13 | End: 2023-12-15

## 2023-12-12 RX ORDER — CELECOXIB 200 MG/1
100 CAPSULE ORAL EVERY 12 HOURS
Refills: 0 | Status: DISCONTINUED | OUTPATIENT
Start: 2023-12-13 | End: 2023-12-13

## 2023-12-12 RX ORDER — APIXABAN 2.5 MG/1
2.5 TABLET, FILM COATED ORAL
Refills: 0 | Status: COMPLETED | OUTPATIENT
Start: 2023-12-12 | End: 2023-12-12

## 2023-12-12 RX ORDER — CEFAZOLIN SODIUM 1 G
VIAL (EA) INJECTION
Refills: 0 | Status: COMPLETED | OUTPATIENT
Start: 2023-12-12 | End: 2023-12-15

## 2023-12-12 RX ORDER — APIXABAN 2.5 MG/1
2.5 TABLET, FILM COATED ORAL EVERY 12 HOURS
Refills: 0 | Status: DISCONTINUED | OUTPATIENT
Start: 2023-12-13 | End: 2023-12-15

## 2023-12-12 RX ORDER — CEFAZOLIN SODIUM 1 G
2000 VIAL (EA) INJECTION ONCE
Refills: 0 | Status: COMPLETED | OUTPATIENT
Start: 2023-12-12 | End: 2023-12-12

## 2023-12-12 RX ADMIN — GABAPENTIN 300 MILLIGRAM(S): 400 CAPSULE ORAL at 21:11

## 2023-12-12 RX ADMIN — Medication 100 MILLIGRAM(S): at 04:12

## 2023-12-12 RX ADMIN — APIXABAN 2.5 MILLIGRAM(S): 2.5 TABLET, FILM COATED ORAL at 21:12

## 2023-12-12 RX ADMIN — Medication 500 MILLIGRAM(S): at 06:07

## 2023-12-12 RX ADMIN — Medication 81 MILLIGRAM(S): at 06:04

## 2023-12-12 RX ADMIN — SODIUM CHLORIDE 100 MILLILITER(S): 9 INJECTION, SOLUTION INTRAVENOUS at 00:01

## 2023-12-12 RX ADMIN — Medication 500 MILLIGRAM(S): at 15:45

## 2023-12-12 RX ADMIN — OXYCODONE HYDROCHLORIDE 15 MILLIGRAM(S): 5 TABLET ORAL at 21:11

## 2023-12-12 RX ADMIN — OXYCODONE HYDROCHLORIDE 15 MILLIGRAM(S): 5 TABLET ORAL at 07:22

## 2023-12-12 RX ADMIN — Medication 101.6 MILLIGRAM(S): at 06:04

## 2023-12-12 RX ADMIN — OXYCODONE HYDROCHLORIDE 15 MILLIGRAM(S): 5 TABLET ORAL at 22:11

## 2023-12-12 RX ADMIN — OXYCODONE HYDROCHLORIDE 15 MILLIGRAM(S): 5 TABLET ORAL at 17:48

## 2023-12-12 RX ADMIN — OXYCODONE HYDROCHLORIDE 15 MILLIGRAM(S): 5 TABLET ORAL at 14:30

## 2023-12-12 RX ADMIN — APIXABAN 2.5 MILLIGRAM(S): 2.5 TABLET, FILM COATED ORAL at 13:30

## 2023-12-12 RX ADMIN — GABAPENTIN 300 MILLIGRAM(S): 400 CAPSULE ORAL at 06:04

## 2023-12-12 RX ADMIN — OXYCODONE HYDROCHLORIDE 15 MILLIGRAM(S): 5 TABLET ORAL at 09:37

## 2023-12-12 RX ADMIN — CELECOXIB 100 MILLIGRAM(S): 200 CAPSULE ORAL at 15:37

## 2023-12-12 RX ADMIN — PANTOPRAZOLE SODIUM 40 MILLIGRAM(S): 20 TABLET, DELAYED RELEASE ORAL at 06:04

## 2023-12-12 RX ADMIN — OXYCODONE HYDROCHLORIDE 15 MILLIGRAM(S): 5 TABLET ORAL at 18:40

## 2023-12-12 RX ADMIN — OXYCODONE HYDROCHLORIDE 15 MILLIGRAM(S): 5 TABLET ORAL at 13:31

## 2023-12-12 RX ADMIN — OXYCODONE HYDROCHLORIDE 15 MILLIGRAM(S): 5 TABLET ORAL at 06:30

## 2023-12-12 RX ADMIN — Medication 145 MILLIGRAM(S): at 21:11

## 2023-12-12 RX ADMIN — Medication 100 MILLIGRAM(S): at 21:11

## 2023-12-12 RX ADMIN — GABAPENTIN 300 MILLIGRAM(S): 400 CAPSULE ORAL at 13:31

## 2023-12-12 RX ADMIN — Medication 500 MILLIGRAM(S): at 06:04

## 2023-12-12 RX ADMIN — OXYCODONE HYDROCHLORIDE 15 MILLIGRAM(S): 5 TABLET ORAL at 10:30

## 2023-12-12 NOTE — DISCHARGE NOTE PROVIDER - CARE PROVIDERS DIRECT ADDRESSES
[email or web address]

## 2023-12-12 NOTE — DISCHARGE NOTE PROVIDER - NSDCCPCAREPLAN_GEN_ALL_CORE_FT
PRINCIPAL DISCHARGE DIAGNOSIS  Diagnosis: Failed total hip arthroplasty  Assessment and Plan of Treatment: Right Hip  Physical Therapy /Occupational Therapy  Total Hip Protocol   - Ambulation  - Transfers   - Stairs   - ADLs (activities of daily living)  Posterior Total Hip Replacement precautions for 6 weeks  - Abduction pillow   - High hip chair for sitting  - No internal rotation,   - No crossing legs   - No sleeping on opposite sides  • Ice 30 minutes several times daily with at least a 60 minute break in between icing sessions.  You have a MORIS dressing. You may shower. Disconnect MORIS battery prior to showering. Reconnect battery after showering and press orange button to resume MORIS power. Remove MORIS dressing on post-op day #7.  Keep incision clean. DO NOT APPLY ANYTHING to incision site (salves/ointments/creams). Do not scrub incision site. Pat dry after shower.  Suture/Prineo removal 2 weeks after surgery at Surgeon's office.   If incision is dry, it may be left open to air.  Opioid safety discussed w/ pt.  Do not keep opioid in the medicine cabinet in bathroom where others may have access to it.  Do not drive while taking opioid.  To dispose of it some pharmacies do have drop boxes as do police stations.  Do not flush or put in trash.

## 2023-12-12 NOTE — DISCHARGE NOTE PROVIDER - CARE PROVIDER_API CALL
Eli Watts  Orthopaedic Surgery  [address], [address]  [place] [address]  Phone: [phone]  Fax: [phone]  Established Patient  Scheduled Appointment: 12/26/2023 03:45 PM   Eli Watts  Orthopaedic Surgery  [address], [address]  [place] [address]  Phone: [phone]  Fax: [phone]  Established Patient  Scheduled Appointment: 12/26/2023 03:45 PM

## 2023-12-12 NOTE — CONSULT NOTE ADULT - ASSESSMENT
67 yo male, pmh of htn, hld, OA, CKD with failed right hip arthroplasty, presenting for revision  - pain control, dvt ppx as per ortho  - + post op acute blood loss anemia, also causing borderline hypotension, asymptomatic but has not stood with PT  - 2 units prbc ordered  - monitor bp  - hold losartan/hctz  - creatinine at 1.6, monitor bmp, urine output  - PT/OT once BP improves, transfusion given  - will follow   69 yo male, pmh of htn, hld, OA, CKD with failed right hip arthroplasty, presenting for revision  - pain control, dvt ppx as per ortho  - + post op acute blood loss anemia, also causing borderline hypotension, asymptomatic but has not stood with PT  - 2 units prbc ordered  - monitor bp  - hold losartan/hctz  - creatinine at 1.6, monitor bmp, urine output  - PT/OT once BP improves, transfusion given  - will follow

## 2023-12-12 NOTE — DISCHARGE NOTE PROVIDER - HOSPITAL COURSE
This patient was admitted to [facility] with a history of severe degenerative joint disease of the right hip s/p R THR in 2006, admitted for increased pain on Right hip.  Patient underwent Pre-Surgical Testing and was medically cleared to undergo elective  procedure. Patient underwent Revision Right total hip arthroplasty by Dr. Watts on 12/11/23. Procedure was well tolerated.  No operative or maverick-operative complications arose during patient's hospital course.  Patient received antibiotic according to SCIP guidelines for infection prevention.  Eliquis 2.5mg q 12 h was given for DVT prophylaxis, in addition to the use of SCDs.  Anesthesia, Medical Hospitalist, Physical Therapy and Occupational Therapy were consulted. Patient is stable for discharge with a good prognosis.  Appropriate discharge instructions and medications are provided in this document. This patient was admitted to [facility] with a history of severe degenerative joint disease of the right hip s/p R THR in 2006, admitted for increased pain on Right hip.  Patient underwent Pre-Surgical Testing and was medically cleared to undergo elective  procedure. Patient underwent Revision Right total hip arthroplasty by Dr. Watts on 12/11/23. Procedure was well tolerated.  No operative or maverick-operative complications arose during patient's hospital course.  Patient received antibiotic according to SCIP guidelines for infection prevention.  Eliquis 2.5mg q 12 h was given for DVT prophylaxis, in addition to the use of SCDs.  Anesthesia, Medical Hospitalist, Physical Therapy and Occupational Therapy were consulted. Patient is stable for discharge with a good prognosis.  Appropriate discharge instructions and medications are provided in this document. This patient was admitted to [facility] with a history of severe degenerative joint disease of the right hip s/p R THR in 2006, admitted for increased pain on Right hip.  Patient underwent Pre-Surgical Testing and was medically cleared to undergo elective  procedure. Patient underwent Revision Right total hip arthroplasty by Dr. Watts on 12/11/23. Procedure was well tolerated.  No operative complications arose during patient's hospital course, but patient did have soft blood pressure in the perioperative period with low hemoglobin for which the patient received 2 units of PRBCs.  Patient received antibiotic according to SCIP guidelines for infection prevention.  Eliquis 2.5mg q 12 h was given for DVT prophylaxis, in addition to the use of SCDs.  Anesthesia, Medical Hospitalist, Physical Therapy and Occupational Therapy were consulted. Patient is stable for discharge with a good prognosis.  Appropriate discharge instructions and medications are provided in this document. This patient was admitted to [facility] with a history of severe degenerative joint disease of the right hip s/p R THR in 2006, admitted for increased pain on Right hip.  Patient underwent Pre-Surgical Testing and was medically cleared to undergo elective  procedure. Patient underwent Revision Right total hip arthroplasty by Dr. Watts on 12/11/23. Procedure was well tolerated.  No operative complications arose during patient's hospital course, but patient did have soft blood pressure in the perioperative period with low hemoglobin for which the patient received 2 units of PRBCs.  Patient received antibiotic according to SCIP guidelines for infection prevention.  Eliquis 2.5mg q 12 h was given for DVT prophylaxis, in addition to the use of SCDs.  Anesthesia, Medical Hospitalist, Physical Therapy and Occupational Therapy were consulted. Patient is stable for discharge with a good prognosis.  Appropriate discharge instructions and medications are provided in this document. This patient was admitted to [facility] with a history of severe degenerative joint disease of the right hip s/p R THR in 2006, admitted for increased pain on Right hip.  Patient underwent Pre-Surgical Testing and was medically cleared to undergo elective  procedure. Patient underwent Revision Right total hip arthroplasty by Dr. Watts on 12/11/23. Procedure was well tolerated.  No operative complications arose during patient's hospital course, but patient did have soft blood pressure in the perioperative period with low hemoglobin for which the patient received 3 units of PRBCs.  Patient received antibiotic according to SCIP guidelines for infection prevention.  Eliquis 2.5mg q 12 h was given for DVT prophylaxis, in addition to the use of SCDs.  Anesthesia, Medical Hospitalist, Physical Therapy and Occupational Therapy were consulted. Patient is stable for discharge with a good prognosis.  Appropriate discharge instructions and medications are provided in this document. This patient was admitted to [facility] with a history of severe degenerative joint disease of the right hip s/p R THR in 2006, admitted for increased pain on Right hip.  Patient underwent Pre-Surgical Testing and was medically cleared to undergo elective  procedure. Patient underwent Revision Right total hip arthroplasty by Dr. Watts on 12/11/23. Procedure was well tolerated.  No operative complications arose during patient's hospital course, but patient did have soft blood pressure in the perioperative period with low hemoglobin for which the patient received 3 units of PRBCs.  Patient received antibiotic according to SCIP guidelines for infection prevention.  Eliquis 2.5mg q 12 h was given for DVT prophylaxis, in addition to the use of SCDs.  Anesthesia, Medical Hospitalist, Physical Therapy and Occupational Therapy were consulted. Patient is stable for discharge with a good prognosis.  Appropriate discharge instructions and medications are provided in this document. This patient was admitted to [facility] with a history of severe degenerative joint disease of the right hip s/p R THR in 2006, admitted for increased pain on Right hip.  Patient underwent Pre-Surgical Testing and was medically cleared to undergo elective  procedure. Patient underwent Revision Right total hip arthroplasty by Dr. Watts on 12/11/23. Procedure was well tolerated.  No operative complications arose during patient's hospital course. Daily lab work followed by orthopedic and medical team. Acute perioperative anemia found, for which the patient received 3 units of PRBCs total. No transfusion complications and labs confirmed an adequate recovery in the perioperative period.  Patient received antibiotic according to SCIP guidelines for infection prevention.  Eliquis 2.5mg q 12 h was given for DVT prophylaxis, in addition to the use of SCDs.  Anesthesia, Infectious disease, Medical Hospitalist, Physical Therapy and Occupational Therapy were consulted. Patient is stable for discharge with a good prognosis.  Appropriate discharge instructions and medications are provided in this document.   Patient is going home with home care (PT/OT/Skilled Nursing) This patient was admitted to [facility] with a history of severe degenerative joint disease of the right hip s/p R THR in 2006, admitted for increased pain on Right hip.  Patient underwent Pre-Surgical Testing and was medically cleared to undergo elective  procedure. Patient underwent Revision Right total hip arthroplasty by Dr. Watts on 12/11/23. Procedure was well tolerated.  No operative complications arose during patient's hospital course. Daily lab work followed by orthopedic and medical team. Acute perioperative anemia found, for which the patient received 3 units of PRBCs total. No transfusion complications and labs confirmed an adequate recovery in the perioperative period.  Patient received antibiotic according to SCIP guidelines for infection prevention.  Eliquis 2.5mg q 12 h was given for DVT prophylaxis, in addition to the use of SCDs.  Anesthesia, Infectious disease, Medical Hospitalist, Physical Therapy and Occupational Therapy were consulted. Patient is stable for discharge with a good prognosis.  Appropriate discharge instructions and medications are provided in this document.   Patient is going home with home care (PT/OT/Skilled Nursing) This patient was admitted to [facility] with a history of severe degenerative joint disease of the right hip s/p R THR in 2006, admitted for increased pain on Right hip.  Patient underwent Pre-Surgical Testing and was medically cleared to undergo elective  procedure. Patient underwent Revision Right total hip arthroplasty by Dr. Watts on 12/11/23. Procedure was well tolerated.  No operative complications arose during patient's hospital course. Daily lab work followed by orthopedic and medical team. Acute perioperative anemia found, for which the patient received 3 units of PRBCs total. No transfusion complications and labs confirmed an adequate recovery/stable lab work in the perioperative period.  Patient received antibiotic according to SCIP guidelines for infection prevention.  Eliquis 2.5mg q 12 h was given for DVT prophylaxis, in addition to the use of SCDs.  Anesthesia, Infectious disease, Medical Hospitalist, Physical Therapy and Occupational Therapy were consulted. Patient is stable for discharge with a good prognosis.  Appropriate discharge instructions and medications are provided in this document.   Patient is going home with home care (PT/OT/Skilled Nursing) This patient was admitted to [facility] with a history of severe degenerative joint disease of the right hip s/p R THR in 2006, admitted for increased pain on Right hip.  Patient underwent Pre-Surgical Testing and was medically cleared to undergo elective  procedure. Patient underwent Revision Right total hip arthroplasty by Dr. Watts on 12/11/23. Procedure was well tolerated.  No operative complications arose during patient's hospital course. Daily lab work followed by orthopedic and medical team. Acute perioperative anemia found, for which the patient received 3 units of PRBCs total. No transfusion complications and labs confirmed an adequate recovery/stable lab work in the perioperative period.  Patient received antibiotic according to SCIP guidelines for infection prevention.  Eliquis 2.5mg q 12 h was given for DVT prophylaxis, in addition to the use of SCDs.  Anesthesia, Infectious disease, Medical Hospitalist, Physical Therapy and Occupational Therapy were consulted. Patient is stable for discharge with a good prognosis.  Appropriate discharge instructions and medications are provided in this document.   Patient is going home with home care (PT/OT/Skilled Nursing)

## 2023-12-12 NOTE — DISCHARGE NOTE PROVIDER - NSDCCPTREATMENT_GEN_ALL_CORE_FT
PRINCIPAL PROCEDURE  Procedure: Partial hip revision, acetabular component only  Findings and Treatment: right

## 2023-12-12 NOTE — PATIENT CHOICE NOTE. - NSPTCHOICESTATE_GEN_ALL_CORE
I have met with the patient and/or caregiver to discuss discharge goals and treatment plan. Patient and/or caregiver also provided with instructions on accessing the CMS Compare websites for additional information related to Post Acute Provider quality and resource use measures to assist them in evaluation of the providers and in selecting their post-acute provider of choice. Patient and caregiver were informed of the facilities that are owned and/or operated by [facility]. I have discussed with the patient the availability of in-network facilities and providers. Patient and caregiver provided with a list of post-acute providers whose services are appropriate to the discharge plans and patient needs.     For patient requiring durable medical equipment, patient and/or caregiver were informed that they have the right to request who provides the required equipment. I have met with the patient and/or caregiver to discuss discharge goals and treatment plan. Patient and/or caregiver also provided with instructions on accessing the CMS Compare websites for additional information related to Post Acute Provider quality and resource use measures to assist them in evaluation of the providers and in selecting their post-acute provider of choice. Patient and caregiver were informed of the facilities that are owned and/or operated by [facility]. I have discussed with the patient the availability of in-network facilities and providers. Patient and caregiver provided with a list of post-acute providers whose services are appropriate to the discharge plans and patient needs.     For patient requiring durable medical equipment, patient and/or caregiver were informed that they have the right to request who provides the required equipment.

## 2023-12-12 NOTE — CARE COORDINATION ASSESSMENT. - OTHER PERTINENT DISCHARGE PLANNING INFORMATION:
was asked to see patient by Romario MURGUIA about possible rehab referral. Patient is a PTE/OTE today. I met with this 68 year old male admitted from home s/p right hip revision by Dr. Watts. SW reviewed social work role availability and options for transition post surgery. He lives with wife in apartment with elevator. Wife is home he works part time security at [employer] which is where he would want rehab if recommended. Today per nurse blood pressure low and H&H low as well. SW waiting for recommendations from OT and PT. Will follow He had some pain complaints but seems to be coping well with admission.   was asked to see patient by Romario MURGUIA about possible rehab referral. Patient is a PTE/OTE today. I met with this 68 year old male admitted from home s/p right hip revision by Dr. Watts. SW reviewed social work role availability and options for transition post surgery. He lives with wife in apartment with elevator. 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 Today per nurse blood pressure low and H&H low as well. SW waiting for recommendations from OT and PT. Will follow He had some pain complaints but seems to be coping well with admission.

## 2023-12-12 NOTE — PATIENT CHOICE NOTE. - NSPTCHOICENOTES_GEN_ALL_CORE
transition of care packet provided if rehab [facility] if home with HC he wanted [facility]  transition of care packet provided if rehab [facility] home [facility] HC [facility]

## 2023-12-12 NOTE — DISCHARGE NOTE PROVIDER - NSDCCPGOAL_GEN_ALL_CORE_FT
To get better and follow your care plan as instructed. Improve ambulation, ADLs and quality of life.  To get better and follow your care plan as instructed. Improve ambulation, ADLs and quality of life. Improve ambulation, ADLs and quality of life.

## 2023-12-12 NOTE — PHARMACOTHERAPY INTERVENTION NOTE - COMMENTS
Admission medication reconciliation POD1 
called Provider regarding elevated scr and contraindication of celebrex - PA said Patient will be monitored and alternative radiation is not an option in this Patient for prevention of heterotopic ossification.

## 2023-12-12 NOTE — DISCHARGE NOTE PROVIDER - NSDCFUSCHEDAPPT_GEN_ALL_CORE_FT
Eli Watts  [facility]  ORTHOSURG [address] [place] [address]  Scheduled Appointment: 12/26/2023    Eli Watts  [facility]  ORTHOR[address]  Scheduled Appointment: 01/23/2024     Eli Watts  [facility]  ORTHOSURG [address] [place] [address]  Scheduled Appointment: 12/26/2023    Eli Watts  [facility]  ORTHOR[address]  Scheduled Appointment: 01/23/2024

## 2023-12-12 NOTE — CONSULT NOTE ADULT - SUBJECTIVE AND OBJECTIVE BOX
HPI:  69 yo male, pmh of htn, hld, CKD, failed right hip replacement, now s/p revision. Having some pain at replacement, given oxycodone with fair improvement. Denies current dizziness, has not gotten up with physical therapy. No nausea, vomiting, headaches, fever, chills    PAST MEDICAL & SURGICAL HISTORY:  Osteoarthritis of right hip      Painful orthopaedic hardware      Hypertension      Hyperlipidemia      Syncopal episodes      Status post total replacement of right hip      S/P appendectomy      S/P hernia repair          ANTIMICROBIAL:    CARDIOVASCULAR:    PULMONARY:    NEUROLOGIC:  acetaminophen     Tablet .. 500 milliGRAM(s) Oral every 8 hours  celecoxib 100 milliGRAM(s) Oral every 12 hours  gabapentin 300 milliGRAM(s) Oral three times a day  HYDROmorphone  Injectable 0.5 milliGRAM(s) IV Push every 3 hours PRN  ondansetron Injectable 4 milliGRAM(s) IV Push every 6 hours PRN  oxyCODONE    IR 15 milliGRAM(s) Oral every 3 hours PRN  oxyCODONE    IR 10 milliGRAM(s) Oral every 3 hours PRN  zolpidem 5 milliGRAM(s) Oral at bedtime PRN  zolpidem 5 milliGRAM(s) Oral at bedtime PRN    ONCOLOGIC:    HEMATOLOGIC:  apixaban 2.5 milliGRAM(s) Oral <User Schedule>    GATROINTESTINAL:  magnesium hydroxide Suspension 30 milliLiter(s) Oral daily PRN  pantoprazole    Tablet 40 milliGRAM(s) Oral before breakfast  polyethylene glycol 3350 17 Gram(s) Oral at bedtime  senna 2 Tablet(s) Oral at bedtime     MEDS:    ENDO/METABOLIC:  atorvastatin 80 milliGRAM(s) Oral at bedtime  fenofibrate Tablet 145 milliGRAM(s) Oral at bedtime    IV FLUID/NUTRITION:  lactated ringers. 1000 milliLiter(s) IV Continuous <Continuous>    TOPICAL:    IMMUNOLOGIC & OTHER        Allergies    No Known Allergies    Intolerances      PAST MEDICAL & SURGICAL HISTORY:  Osteoarthritis of right hip      Painful orthopaedic hardware      Hypertension      Hyperlipidemia      Syncopal episodes      Status post total replacement of right hip      S/P appendectomy      S/P hernia repair        SOCIAL HISTORY:    FAMILY HISTORY:  Family history of hyperlipidemia (Father)        Vital Signs Last 24 Hrs  T(C): 36.8 (12 Dec 2023 08:06), Max: 37.1 (11 Dec 2023 23:25)  T(F): 98.2 (12 Dec 2023 08:06), Max: 98.7 (11 Dec 2023 23:25)  HR: 80 (12 Dec 2023 08:06) (77 - 104)  BP: 105/57 (12 Dec 2023 09:43) (80/52 - 108/74)  BP(mean): --  RR: 18 (12 Dec 2023 08:06) (14 - 19)  SpO2: 97% (12 Dec 2023 08:06) (96% - 100%)    Parameters below as of 12 Dec 2023 08:06  Patient On (Oxygen Delivery Method): room air          I&O's Detail    11 Dec 2023 07:01  -  12 Dec 2023 07:00  --------------------------------------------------------  IN:    IV PiggyBack: 50 mL    Lactated Ringers: 2900 mL    PRBCs (Packed Red Blood Cells): 306 mL    Sodium Chloride 0.9% Bolus: 500 mL  Total IN: 3756 mL    OUT:    Blood Loss (mL): 600 mL    Indwelling Catheter - Urethral (mL): 2000 mL  Total OUT: 2600 mL    Total NET: 1156 mL      12 Dec 2023 07:01  -  12 Dec 2023 12:37  --------------------------------------------------------  IN:  Total IN: 0 mL    OUT:    Voided (mL): 900 mL  Total OUT: 900 mL    Total NET: -900 mL        Daily     Daily     REVIEW OF SYSTEMS:  as per hpi, other systems reviewed and are otherwise negative    PHYSICAL EXAM:    GENERAL: NAD, older male, weak  HEAD:  Atraumatic, Normocephalic  EYES: EOMI, PERRLA, conjunctiva and sclera clear  ENMT: No tonsillar erythema, exudates, or enlargement; Moist mucous membranes,  NECK: Supple, No JVD  NERVOUS SYSTEM:  Alert & Oriented X3, Good concentration; no focal deficits  CHEST/LUNG: Clear to percussion bilaterally; No rales, rhonchi, wheezing, or rubs  HEART: Regular rate and rhythm; No murmurs, rubs, or gallops  ABDOMEN: Soft, Nontender, Nondistended; Bowel sounds present  EXTREMITIES:  2+ Peripheral Pulses, right hip site c/d/i, no worsening swelling, appropriately tender  LYMPH: No lymphadenopathy   SKIN: No rashes or lesions      LABS:                        7.2    6.98  )-----------( 179      ( 12 Dec 2023 08:09 )             22.7     12-12    135  |  102  |  31<H>  ----------------------------<  120<H>  4.5   |  27  |  1.68<H>    Ca    8.7      12 Dec 2023 08:09      PT/INR - ( 11 Dec 2023 11:32 )   PT: 11.8 sec;   INR: 1.06 ratio         PTT - ( 11 Dec 2023 11:32 )  PTT:32.5 sec  Urinalysis Basic - ( 12 Dec 2023 08:09 )    Color: x / Appearance: x / SG: x / pH: x  Gluc: 120 mg/dL / Ketone: x  / Bili: x / Urobili: x   Blood: x / Protein: x / Nitrite: x   Leuk Esterase: x / RBC: x / WBC x   Sq Epi: x / Non Sq Epi: x / Bacteria: x          PT/INR - ( 11 Dec 2023 11:32 )   PT: 11.8 sec;   INR: 1.06 ratio         PTT - ( 11 Dec 2023 11:32 )  PTT:32.5 sec    RADIOLOGY & ADDITIONAL STUDIES:

## 2023-12-12 NOTE — CONSULT NOTE ADULT - SUBJECTIVE AND OBJECTIVE BOX
HPI:  66YO M PMH Right Total Hip Replacement in 2006 and has been unable to lie on his right side since that time with increasing discomfort and pain. He has had arthrocentesis several weeks ago. PT denies fever chills n/v/d CP SOB. Denies fall trauma. Seen by Dr Watts outpt and revision was recommended, Pt is sp Partial hip revision, acetabular component only 11-Dec-2023· Surgeon noted reactive inflammatory tissue reaction.     Infectious Disease consult was called to evaluate pt for rule out prosthetic joint infection.       Past Medical & Surgical Hx:  PAST MEDICAL & SURGICAL HISTORY:  Osteoarthritis of right hip  Painful orthopaedic hardware  Hypertension  Hyperlipidemia  Syncopal episodes  Status post total replacement of right hip  S/P appendectomy  S/P hernia repair      Social History--  EtOH: denies   Tobacco: denies  Drug Use: denies       FAMILY HISTORY:  Family history of hyperlipidemia (Father)      Allergies  No Known Allergies    Intolerances  NONE    Home Medications:  Ambien 10 mg oral tablet: 1 tab(s) orally once a day (at bedtime) as needed for  insomnia (11 Dec 2023 11:13)  fenofibrate 160 mg oral tablet: 1 tab(s) orally once a day (in the evening) (11 Dec 2023 11:13)  gabapentin 300 mg oral capsule: 1 cap(s) orally 3 times a day (11 Dec 2023 11:13)  Elliot-Synephrine 12 Hour 0.05% nasal spray: 1 spray(s) in each nostril once a day (at bedtime) (11 Dec 2023 11:13)  oxyCODONE 5 mg oral tablet: 1 tab(s) orally every 6 hours as needed for  moderate pain (11 Dec 2023 11:13)  rosuvastatin 20 mg oral tablet: 1 tab(s) orally once a day (in the evening) (11 Dec 2023 11:13)  Tylenol 500 mg oral tablet: 2 tab(s) orally every 8 hours as needed for  moderate pain (11 Dec 2023 11:13)  valsartan-hydrochlorothiazide 160 mg-12.5 mg oral tablet: 1 tab(s) orally once a day (11 Dec 2023 11:13)      Current Inpatient Medications :    ANTIBIOTICS:          OTHER RELEVANT MEDICATIONS :  acetaminophen     Tablet .. 500 milliGRAM(s) Oral every 8 hours  apixaban 2.5 milliGRAM(s) Oral <User Schedule>  fenofibrate Tablet 145 milliGRAM(s) Oral at bedtime  gabapentin 300 milliGRAM(s) Oral three times a day  HYDROmorphone  Injectable 0.5 milliGRAM(s) IV Push every 3 hours PRN  lactated ringers. 1000 milliLiter(s) IV Continuous <Continuous>  magnesium hydroxide Suspension 30 milliLiter(s) Oral daily PRN  ondansetron Injectable 4 milliGRAM(s) IV Push every 6 hours PRN  oxyCODONE    IR 15 milliGRAM(s) Oral every 3 hours PRN  oxyCODONE    IR 10 milliGRAM(s) Oral every 3 hours PRN  pantoprazole    Tablet 40 milliGRAM(s) Oral before breakfast  polyethylene glycol 3350 17 Gram(s) Oral at bedtime  senna 2 Tablet(s) Oral at bedtime  zolpidem 5 milliGRAM(s) Oral at bedtime PRN  zolpidem 5 milliGRAM(s) Oral at bedtime PRN      ROS:  CONSTITUTIONAL:  Negative fever or chills  EYES:  Negative  blurry vision or double vision  CARDIOVASCULAR:  Negative for chest pain or palpitations  RESPIRATORY:  Negative for cough, wheezing, or SOB   GASTROINTESTINAL:  Negative for nausea, vomiting, diarrhea, constipation, or abdominal pain  GENITOURINARY:  Negative frequency, urgency , dysuria or hematuria   NEUROLOGIC:  No headache, confusion, dizziness, lightheadedness  All other systems were reviewed and are negative          I&O's Detail    11 Dec 2023 07:01  -  12 Dec 2023 07:00  --------------------------------------------------------  IN:    IV PiggyBack: 50 mL    Lactated Ringers: 2900 mL    PRBCs (Packed Red Blood Cells): 306 mL    Sodium Chloride 0.9% Bolus: 500 mL  Total IN: 3756 mL    OUT:    Blood Loss (mL): 600 mL    Indwelling Catheter - Urethral (mL): 2000 mL  Total OUT: 2600 mL    Total NET: 1156 mL      12 Dec 2023 07:01  -  12 Dec 2023 20:39  --------------------------------------------------------  IN:  Total IN: 0 mL    OUT:    Voided (mL): 900 mL  Total OUT: 900 mL    Total NET: -900 mL          Physical Exam:  Vital Signs Last 24 Hrs  T(C): 36.8 (12 Dec 2023 19:27), Max: 37.1 (11 Dec 2023 23:25)  T(F): 98.2 (12 Dec 2023 19:27), Max: 98.7 (11 Dec 2023 23:25)  HR: 74 (12 Dec 2023 19:27) (74 - 85)  BP: 100/55 (12 Dec 2023 19:27) (88/45 - 105/57)  RR: 17 (12 Dec 2023 19:27) (17 - 18)  SpO2: 96% (12 Dec 2023 19:27) (96% - 99%)    Parameters below as of 12 Dec 2023 15:45  Patient On (Oxygen Delivery Method): room air        General: no acute distress  Neck: supple, trachea midline  Lungs: clear, no wheeze/rhonchi  Cardiovascular: regular rate and rhythm, S1 S2  Abdomen: soft, nontender, ND, bowel sounds normal  Neurological:  alert and oriented x3  Skin: no rash  Extremities: right hip surgical site c/d/i  No erythema mild swelling    Labs:                         7.2    6.98  )-----------( 179      ( 12 Dec 2023 08:09 )             22.7   12-12    135  |  102  |  31<H>  ----------------------------<  120<H>  4.5   |  27  |  1.68<H>    Ca    8.7      12 Dec 2023 08:09        RECENT CULTURES:    Culture - Joint (collected 11 Dec 2023 14:01)  Source: Hip RIGHT HIP JOINT 5  Gram Stain (12 Dec 2023 00:52):    No polymorphonuclear leukocytes seen per low power field    No organisms seen per oil power field  Preliminary Report (12 Dec 2023 18:41):    No growth to date.    Culture - Joint (collected 11 Dec 2023 14:01)  Source: Hip RIGHT HIP JOINT 4  Gram Stain (12 Dec 2023 00:51):    No polymorphonuclear leukocytes seen per low power field    No organisms seen per oil power field  Preliminary Report (12 Dec 2023 18:40):    No growth to date.    Culture - Joint (collected 11 Dec 2023 14:01)  Source: Hip RIGHT HIP JOINT 3  Gram Stain (12 Dec 2023 00:53):    No polymorphonuclear leukocytes seen per low power field    No organisms seen per oil power field  Preliminary Report (12 Dec 2023 18:37):    No growth to date.    Culture - Joint (collected 11 Dec 2023 14:01)  Source: Hip RIGHT HIP JOINT 2  Gram Stain (12 Dec 2023 00:52):    No polymorphonuclear leukocytes seen per low power field    No organisms seen per oil power field  Preliminary Report (12 Dec 2023 18:34):    No growth to date.    Culture - Joint (collected 11 Dec 2023 14:01)  Source: Hip RIGHT HIP JOINT 1  Gram Stain (12 Dec 2023 00:52):    No polymorphonuclear leukocytes seen per low power field    No organisms seen per oil power field  Preliminary Report (12 Dec 2023 18:42):    No growth to date.    Culture - Joint (collected 11 Dec 2023 14:01)  Source: Joint Fl Joint Fluid  Gram Stain (12 Dec 2023 00:54):    No polymorphonuclear leukocytes seen per low power field    No organisms seen per oil power field  Preliminary Report (12 Dec 2023 18:57):    No growth to date.    RADIOLOGY & ADDITIONAL STUDIES:    Assessment :   66YO M PMH Right Total Hip Replacement in 2006 and has been unable to lie on his right side since that time with increasing discomfort and pain. He has had arthrocentesis several weeks ago. PT denies fever chills n/v/d CP SOB. Denies fall trauma. Seen by Dr Watts outpt and revision was recommended, Pt is sp Partial hip revision, acetabular component only 11-Dec-2023· Surgeon noted reactive inflammatory tissue reaction.   Rule out prosthetic joint infection though suspicion is low  OR cultures NGTD    Plan :   Empiric Ancef  Fu OR cultures- if neg x 72 hours then dc antibiotics  Trend temps and cbc  Pulm toileting  Increase activity  Stable from ID standpoint    Continue with present regiment .  Approptiate use of antibiotics and adverse effects reviewed.      I have discussed the above plan of care with patient in detail. He expressed understanding of the treatment plan . Risks, benefits and alternatives discussed in detail. I have asked if he has any questions or concerns and appropriately addressed them to the best of my ability .      > 45 minutes spent in direct patient care reviewing  the notes, lab data/ imaging , discussion with multidisciplinary team. All questions were addressed and answered to the best of my capacity .    Thank you for allowing me to participate in the care of your patient .      Tg Bruno MD  Infectious Disease  [phone]   HPI:  68YO M PMH Right Total Hip Replacement in 2006 and has been unable to lie on his right side since that time with increasing discomfort and pain. He has had arthrocentesis several weeks ago. PT denies fever chills n/v/d CP SOB. Denies fall trauma. Seen by Dr Watts outpt and revision was recommended, Pt is sp Partial hip revision, acetabular component only 11-Dec-2023· Surgeon noted reactive inflammatory tissue reaction.     Infectious Disease consult was called to evaluate pt for rule out prosthetic joint infection.       Past Medical & Surgical Hx:  PAST MEDICAL & SURGICAL HISTORY:  Osteoarthritis of right hip  Painful orthopaedic hardware  Hypertension  Hyperlipidemia  Syncopal episodes  Status post total replacement of right hip  S/P appendectomy  S/P hernia repair      Social History--  EtOH: denies   Tobacco: denies  Drug Use: denies       FAMILY HISTORY:  Family history of hyperlipidemia (Father)      Allergies  No Known Allergies    Intolerances  NONE    Home Medications:  Ambien 10 mg oral tablet: 1 tab(s) orally once a day (at bedtime) as needed for  insomnia (11 Dec 2023 11:13)  fenofibrate 160 mg oral tablet: 1 tab(s) orally once a day (in the evening) (11 Dec 2023 11:13)  gabapentin 300 mg oral capsule: 1 cap(s) orally 3 times a day (11 Dec 2023 11:13)  Elliot-Synephrine 12 Hour 0.05% nasal spray: 1 spray(s) in each nostril once a day (at bedtime) (11 Dec 2023 11:13)  oxyCODONE 5 mg oral tablet: 1 tab(s) orally every 6 hours as needed for  moderate pain (11 Dec 2023 11:13)  rosuvastatin 20 mg oral tablet: 1 tab(s) orally once a day (in the evening) (11 Dec 2023 11:13)  Tylenol 500 mg oral tablet: 2 tab(s) orally every 8 hours as needed for  moderate pain (11 Dec 2023 11:13)  valsartan-hydrochlorothiazide 160 mg-12.5 mg oral tablet: 1 tab(s) orally once a day (11 Dec 2023 11:13)      Current Inpatient Medications :    ANTIBIOTICS:          OTHER RELEVANT MEDICATIONS :  acetaminophen     Tablet .. 500 milliGRAM(s) Oral every 8 hours  apixaban 2.5 milliGRAM(s) Oral <User Schedule>  fenofibrate Tablet 145 milliGRAM(s) Oral at bedtime  gabapentin 300 milliGRAM(s) Oral three times a day  HYDROmorphone  Injectable 0.5 milliGRAM(s) IV Push every 3 hours PRN  lactated ringers. 1000 milliLiter(s) IV Continuous <Continuous>  magnesium hydroxide Suspension 30 milliLiter(s) Oral daily PRN  ondansetron Injectable 4 milliGRAM(s) IV Push every 6 hours PRN  oxyCODONE    IR 15 milliGRAM(s) Oral every 3 hours PRN  oxyCODONE    IR 10 milliGRAM(s) Oral every 3 hours PRN  pantoprazole    Tablet 40 milliGRAM(s) Oral before breakfast  polyethylene glycol 3350 17 Gram(s) Oral at bedtime  senna 2 Tablet(s) Oral at bedtime  zolpidem 5 milliGRAM(s) Oral at bedtime PRN  zolpidem 5 milliGRAM(s) Oral at bedtime PRN      ROS:  CONSTITUTIONAL:  Negative fever or chills  EYES:  Negative  blurry vision or double vision  CARDIOVASCULAR:  Negative for chest pain or palpitations  RESPIRATORY:  Negative for cough, wheezing, or SOB   GASTROINTESTINAL:  Negative for nausea, vomiting, diarrhea, constipation, or abdominal pain  GENITOURINARY:  Negative frequency, urgency , dysuria or hematuria   NEUROLOGIC:  No headache, confusion, dizziness, lightheadedness  All other systems were reviewed and are negative          I&O's Detail    11 Dec 2023 07:01  -  12 Dec 2023 07:00  --------------------------------------------------------  IN:    IV PiggyBack: 50 mL    Lactated Ringers: 2900 mL    PRBCs (Packed Red Blood Cells): 306 mL    Sodium Chloride 0.9% Bolus: 500 mL  Total IN: 3756 mL    OUT:    Blood Loss (mL): 600 mL    Indwelling Catheter - Urethral (mL): 2000 mL  Total OUT: 2600 mL    Total NET: 1156 mL      12 Dec 2023 07:01  -  12 Dec 2023 20:39  --------------------------------------------------------  IN:  Total IN: 0 mL    OUT:    Voided (mL): 900 mL  Total OUT: 900 mL    Total NET: -900 mL          Physical Exam:  Vital Signs Last 24 Hrs  T(C): 36.8 (12 Dec 2023 19:27), Max: 37.1 (11 Dec 2023 23:25)  T(F): 98.2 (12 Dec 2023 19:27), Max: 98.7 (11 Dec 2023 23:25)  HR: 74 (12 Dec 2023 19:27) (74 - 85)  BP: 100/55 (12 Dec 2023 19:27) (88/45 - 105/57)  RR: 17 (12 Dec 2023 19:27) (17 - 18)  SpO2: 96% (12 Dec 2023 19:27) (96% - 99%)    Parameters below as of 12 Dec 2023 15:45  Patient On (Oxygen Delivery Method): room air        General: no acute distress  Neck: supple, trachea midline  Lungs: clear, no wheeze/rhonchi  Cardiovascular: regular rate and rhythm, S1 S2  Abdomen: soft, nontender, ND, bowel sounds normal  Neurological:  alert and oriented x3  Skin: no rash  Extremities: right hip surgical site c/d/i  No erythema mild swelling    Labs:                         7.2    6.98  )-----------( 179      ( 12 Dec 2023 08:09 )             22.7   12-12    135  |  102  |  31<H>  ----------------------------<  120<H>  4.5   |  27  |  1.68<H>    Ca    8.7      12 Dec 2023 08:09        RECENT CULTURES:    Culture - Joint (collected 11 Dec 2023 14:01)  Source: Hip RIGHT HIP JOINT 5  Gram Stain (12 Dec 2023 00:52):    No polymorphonuclear leukocytes seen per low power field    No organisms seen per oil power field  Preliminary Report (12 Dec 2023 18:41):    No growth to date.    Culture - Joint (collected 11 Dec 2023 14:01)  Source: Hip RIGHT HIP JOINT 4  Gram Stain (12 Dec 2023 00:51):    No polymorphonuclear leukocytes seen per low power field    No organisms seen per oil power field  Preliminary Report (12 Dec 2023 18:40):    No growth to date.    Culture - Joint (collected 11 Dec 2023 14:01)  Source: Hip RIGHT HIP JOINT 3  Gram Stain (12 Dec 2023 00:53):    No polymorphonuclear leukocytes seen per low power field    No organisms seen per oil power field  Preliminary Report (12 Dec 2023 18:37):    No growth to date.    Culture - Joint (collected 11 Dec 2023 14:01)  Source: Hip RIGHT HIP JOINT 2  Gram Stain (12 Dec 2023 00:52):    No polymorphonuclear leukocytes seen per low power field    No organisms seen per oil power field  Preliminary Report (12 Dec 2023 18:34):    No growth to date.    Culture - Joint (collected 11 Dec 2023 14:01)  Source: Hip RIGHT HIP JOINT 1  Gram Stain (12 Dec 2023 00:52):    No polymorphonuclear leukocytes seen per low power field    No organisms seen per oil power field  Preliminary Report (12 Dec 2023 18:42):    No growth to date.    Culture - Joint (collected 11 Dec 2023 14:01)  Source: Joint Fl Joint Fluid  Gram Stain (12 Dec 2023 00:54):    No polymorphonuclear leukocytes seen per low power field    No organisms seen per oil power field  Preliminary Report (12 Dec 2023 18:57):    No growth to date.    RADIOLOGY & ADDITIONAL STUDIES:    Assessment :   68YO M PMH Right Total Hip Replacement in 2006 and has been unable to lie on his right side since that time with increasing discomfort and pain. He has had arthrocentesis several weeks ago. PT denies fever chills n/v/d CP SOB. Denies fall trauma. Seen by Dr Watts outpt and revision was recommended, Pt is sp Partial hip revision, acetabular component only 11-Dec-2023· Surgeon noted reactive inflammatory tissue reaction.   Rule out prosthetic joint infection though suspicion is low  OR cultures NGTD    Plan :   Empiric Ancef  Fu OR cultures- if neg x 72 hours then dc antibiotics  Trend temps and cbc  Pulm toileting  Increase activity  Stable from ID standpoint    Continue with present regiment .  Approptiate use of antibiotics and adverse effects reviewed.      I have discussed the above plan of care with patient in detail. He expressed understanding of the treatment plan . Risks, benefits and alternatives discussed in detail. I have asked if he has any questions or concerns and appropriately addressed them to the best of my ability .      > 45 minutes spent in direct patient care reviewing  the notes, lab data/ imaging , discussion with multidisciplinary team. All questions were addressed and answered to the best of my capacity .    Thank you for allowing me to participate in the care of your patient .      Tg Bruno MD  Infectious Disease  [phone]

## 2023-12-12 NOTE — PROGRESS NOTE ADULT - SUBJECTIVE AND OBJECTIVE BOX
POST OPERATIVE DAY #:  1  STATUS POST: Right Rev THR                       SUBJECTIVE: Patient seen and examined.  Patient did not get out of bed yet with PT and is pending full evaluation. PT on hold until after 2 units of PRBCs.  Patient had Doran post-op which was discontinued this morning. Trial of void this morning. Tolerating diet. Patient has had soft BP and Hgb is low. Patient is currently asymptomatic but will likely become symptomatic if he stands/does PT. PT on hold.   Reported Pain score =8/10, received pain medication prior to exam and states it is working. Patient takes narcotic regularly home and thus is on a higher regimen of narcotics post-op. Discussed multi-modal pain regimen with patient who expressed full understanding.   Denies: CP/SOB/N/V/Numbness/Tingling    OBJECTIVE:     Vital Signs Last 24 Hrs  T(C): 36.8 (12 Dec 2023 08:06), Max: 37.1 (11 Dec 2023 23:25)  T(F): 98.2 (12 Dec 2023 08:06), Max: 98.7 (11 Dec 2023 23:25)  HR: 80 (12 Dec 2023 08:06) (77 - 104)  BP: 96/58 (12 Dec 2023 08:06) (80/52 - 146/74)  RR: 18 (12 Dec 2023 08:06) (13 - 19)  SpO2: 97% (12 Dec 2023 08:06) (96% - 100%)    Parameters below as of 12 Dec 2023 08:06  Patient On (Oxygen Delivery Method): room air      Gen: AAOx3, resting in bed  Abd: soft, NT, ND  Right hip:          MORIS Dressing: clean/dry/intact, flashing green/ok, no erythema or discharge noted  Bilateral LEs:         Sensation:  intact to light touch          Motor exam:  5/5 dorsiflexion/plantarflexion/EHL          2+ DP pulses          calf supple, NT         Abduction pillow in place         SCDs in place    LABS:                        7.2    6.98  )-----------( 179      ( 12 Dec 2023 08:09 )             22.7     12-12    135  |  102  |  31<H>  ----------------------------<  120<H>  4.5   |  27  |  1.68<H>    Ca    8.7      12 Dec 2023 08:09      PT/INR - ( 11 Dec 2023 11:32 )   PT: 11.8 sec;   INR: 1.06 ratio         PTT - ( 11 Dec 2023 11:32 )  PTT:32.5 sec        MEDICATIONS:  Anticoagulation:  aspirin enteric coated 81 milliGRAM(s) Oral every 12 hours      Pain medications:   acetaminophen     Tablet .. 500 milliGRAM(s) Oral every 8 hours  celecoxib 100 milliGRAM(s) Oral every 12 hours  gabapentin 300 milliGRAM(s) Oral three times a day  HYDROmorphone  Injectable 0.5 milliGRAM(s) IV Push every 3 hours PRN  oxyCODONE    IR 15 milliGRAM(s) Oral every 3 hours PRN  oxyCODONE    IR 10 milliGRAM(s) Oral every 3 hours PRN          A/P :68y Male   s/p Right Revision THR POD # 1  -    Pain control: Acetaminophen, Celebrex, Oxycodone, Dilaudid   -    Narcan upon D/C if going home due to concurrent Zolpidem as home med.  -    TOV this AM after removal of Doran this morning.   -    Cryotherapy with protective skin barrier  -    Incentive Spirometry  -    Anemia: getting 2 units due to low Hgb and soft BP. 2u PRBCs ordered.   -    DVT ppx:   Aspirin 81mg q 12 h   and ambulation as tolerated  -    Weight bearing status: WBAT   -    Medicine Co-management- contacted regarding CKD and Anemia, and low BP. Patient is currently asymptomatic, but has a soft BP and likely will be symptomatic if he stands.  -    Continue posterior total hip precautions  -    Physical Therapy- hold until after 2 units of blood. (2u PRBCs)  -    Occupational Therapy  -    ID consult. Intra-op Cultures NGTD  -    Discharge plan:   home vs [facility] pending PT evaluation, OT, medical clearance. ID consult still pending. May need authorization for Rehab.  POST OPERATIVE DAY #:  1  STATUS POST: Right Rev THR                       SUBJECTIVE: Patient seen and examined.  Patient did not get out of bed yet with PT and is pending full evaluation. PT on hold until after 2 units of PRBCs.  Patient had Doran post-op which was discontinued this morning. Trial of void this morning. Tolerating diet. Patient has had soft BP and Hgb is low. Patient is currently asymptomatic but will likely become symptomatic if he stands/does PT. PT on hold.   Reported Pain score =8/10, received pain medication prior to exam and states it is working. Patient takes narcotic regularly home and thus is on a higher regimen of narcotics post-op. Discussed multi-modal pain regimen with patient who expressed full understanding.   Denies: CP/SOB/N/V/Numbness/Tingling    OBJECTIVE:     Vital Signs Last 24 Hrs  T(C): 36.8 (12 Dec 2023 08:06), Max: 37.1 (11 Dec 2023 23:25)  T(F): 98.2 (12 Dec 2023 08:06), Max: 98.7 (11 Dec 2023 23:25)  HR: 80 (12 Dec 2023 08:06) (77 - 104)  BP: 96/58 (12 Dec 2023 08:06) (80/52 - 146/74)  RR: 18 (12 Dec 2023 08:06) (13 - 19)  SpO2: 97% (12 Dec 2023 08:06) (96% - 100%)    Parameters below as of 12 Dec 2023 08:06  Patient On (Oxygen Delivery Method): room air      Gen: AAOx3, resting in bed  Abd: soft, NT, ND  Right hip:          MORIS Dressing: clean/dry/intact, flashing green/ok, no erythema or discharge noted  Bilateral LEs:         Sensation:  intact to light touch          Motor exam:  5/5 dorsiflexion/plantarflexion/EHL          2+ DP pulses          calf supple, NT         Abduction pillow in place         SCDs in place    LABS:                        7.2    6.98  )-----------( 179      ( 12 Dec 2023 08:09 )             22.7     12-12    135  |  102  |  31<H>  ----------------------------<  120<H>  4.5   |  27  |  1.68<H>    Ca    8.7      12 Dec 2023 08:09      PT/INR - ( 11 Dec 2023 11:32 )   PT: 11.8 sec;   INR: 1.06 ratio         PTT - ( 11 Dec 2023 11:32 )  PTT:32.5 sec        MEDICATIONS:  Anticoagulation:  aspirin enteric coated 81 milliGRAM(s) Oral every 12 hours      Pain medications:   acetaminophen     Tablet .. 500 milliGRAM(s) Oral every 8 hours  celecoxib 100 milliGRAM(s) Oral every 12 hours  gabapentin 300 milliGRAM(s) Oral three times a day  HYDROmorphone  Injectable 0.5 milliGRAM(s) IV Push every 3 hours PRN  oxyCODONE    IR 15 milliGRAM(s) Oral every 3 hours PRN  oxyCODONE    IR 10 milliGRAM(s) Oral every 3 hours PRN          A/P :68y Male   s/p Right Revision THR POD # 1  -    Pain control: Acetaminophen, Celebrex, Oxycodone, Dilaudid   -    Narcan upon D/C if going home due to concurrent Zolpidem as home med.  -    TOV this AM after removal of Doran this morning.   -    Cryotherapy with protective skin barrier  -    Incentive Spirometry  -    Anemia: getting 2 units due to low Hgb and soft BP. 2u PRBCs ordered.   -    DVT ppx:   Aspirin 81mg q 12 h   and ambulation as tolerated  -    Weight bearing status: WBAT   -    Medicine Co-management- contacted regarding CKD and Anemia, and low BP. Patient is currently asymptomatic, but has a soft BP and likely will be symptomatic if he stands. Discussed administration of 2 units of blood with Hospitalist (Dr. Snyder) who agrees due to blood loss intra-op and low Hgb today.   -    Continue posterior total hip precautions  -    Physical Therapy- hold until after 2 units of blood. (2u PRBCs)  -    Occupational Therapy  -    ID consult. Intra-op Cultures NGTD  -    Discharge plan:   home vs [facility] pending PT evaluation, OT, medical clearance. ID consult still pending. May need authorization for Rehab.  POST OPERATIVE DAY #:  1  STATUS POST: Right Rev THR                       SUBJECTIVE: Patient seen and examined.  Patient did not get out of bed yet with PT and is pending full evaluation. PT on hold until after 2 units of PRBCs.  Patient had Doran post-op which was discontinued this morning. Trial of void this morning. Tolerating diet. Patient has had soft BP and Hgb is low. Patient is currently asymptomatic but will likely become symptomatic if he stands/does PT. PT on hold.   Reported Pain score =8/10, received pain medication prior to exam and states it is working. Patient takes narcotic regularly home and thus is on a higher regimen of narcotics post-op. Discussed multi-modal pain regimen with patient who expressed full understanding.   Denies: CP/SOB/N/V/Numbness/Tingling    OBJECTIVE:     Vital Signs Last 24 Hrs  T(C): 36.8 (12 Dec 2023 08:06), Max: 37.1 (11 Dec 2023 23:25)  T(F): 98.2 (12 Dec 2023 08:06), Max: 98.7 (11 Dec 2023 23:25)  HR: 80 (12 Dec 2023 08:06) (77 - 104)  BP: 96/58 (12 Dec 2023 08:06) (80/52 - 146/74)  RR: 18 (12 Dec 2023 08:06) (13 - 19)  SpO2: 97% (12 Dec 2023 08:06) (96% - 100%)    Parameters below as of 12 Dec 2023 08:06  Patient On (Oxygen Delivery Method): room air      Gen: AAOx3, resting in bed  Abd: soft, NT, ND  Right hip:          MORIS Dressing: clean/dry/intact, flashing green/ok, no erythema or discharge noted  Bilateral LEs:         Sensation:  intact to light touch          Motor exam:  5/5 dorsiflexion/plantarflexion/EHL          2+ DP pulses          calf supple, NT         Abduction pillow in place         SCDs in place    LABS:                        7.2    6.98  )-----------( 179      ( 12 Dec 2023 08:09 )             22.7     12-12    135  |  102  |  31<H>  ----------------------------<  120<H>  4.5   |  27  |  1.68<H>    Ca    8.7      12 Dec 2023 08:09      PT/INR - ( 11 Dec 2023 11:32 )   PT: 11.8 sec;   INR: 1.06 ratio         PTT - ( 11 Dec 2023 11:32 )  PTT:32.5 sec        MEDICATIONS:  Anticoagulation:  aspirin enteric coated 81 milliGRAM(s) Oral every 12 hours      Pain medications:   acetaminophen     Tablet .. 500 milliGRAM(s) Oral every 8 hours  celecoxib 100 milliGRAM(s) Oral every 12 hours  gabapentin 300 milliGRAM(s) Oral three times a day  HYDROmorphone  Injectable 0.5 milliGRAM(s) IV Push every 3 hours PRN  oxyCODONE    IR 15 milliGRAM(s) Oral every 3 hours PRN  oxyCODONE    IR 10 milliGRAM(s) Oral every 3 hours PRN          A/P :68y Male   s/p Right Revision THR POD # 1  -    Pain control: Acetaminophen, Celebrex, Oxycodone, Dilaudid   -    Narcan upon D/C if going home due to concurrent Zolpidem as home med.  -    TOV this AM after removal of Doran this morning.   -    Cryotherapy with protective skin barrier  -    Incentive Spirometry  -    Anemia: getting 2 units due to low Hgb and soft BP. 2u PRBCs ordered.   -    Cr elevation- will monitor after 2u PRBCs, f/u am labs tomorrow. Patient needs Celebrex 100 q 12h for HO ppx (orthopedic surgeon confirmed)  -    DVT ppx:   Aspirin 81mg q 12 h   and ambulation as tolerated  -    Weight bearing status: WBAT   -    Medicine Co-management- contacted regarding CKD and Anemia, and low BP. Patient is currently asymptomatic, but has a soft BP and likely will be symptomatic if he stands. Discussed administration of 2 units of blood with Hospitalist (Dr. Snyder) who agrees due to blood loss intra-op and low Hgb today.   -    Continue posterior total hip precautions  -    Physical Therapy- hold until after 2 units of blood. (2u PRBCs)  -    Occupational Therapy  -    ID consult. Intra-op Cultures NGTD  -    Discharge plan:   home vs [facility] pending PT evaluation, OT, medical clearance. ID consult still pending. May need authorization for Rehab.  POST OPERATIVE DAY #:  1  STATUS POST: Right Rev THR                       SUBJECTIVE: Patient seen and examined.  Patient did not get out of bed yet with PT and is pending full evaluation. PT on hold until after 2 units of PRBCs.  Patient had Doran post-op which was discontinued this morning. Trial of void this morning. Tolerating diet. Patient has had soft BP and Hgb is low. Patient is currently asymptomatic but will likely become symptomatic if he stands/does PT. PT on hold.   Reported Pain score =8/10, received pain medication prior to exam and states it is working. Patient takes narcotic regularly home and thus is on a higher regimen of narcotics post-op. Discussed multi-modal pain regimen with patient who expressed full understanding.   Denies: CP/SOB/N/V/Numbness/Tingling    OBJECTIVE:     Vital Signs Last 24 Hrs  T(C): 36.8 (12 Dec 2023 08:06), Max: 37.1 (11 Dec 2023 23:25)  T(F): 98.2 (12 Dec 2023 08:06), Max: 98.7 (11 Dec 2023 23:25)  HR: 80 (12 Dec 2023 08:06) (77 - 104)  BP: 96/58 (12 Dec 2023 08:06) (80/52 - 146/74)  RR: 18 (12 Dec 2023 08:06) (13 - 19)  SpO2: 97% (12 Dec 2023 08:06) (96% - 100%)    Parameters below as of 12 Dec 2023 08:06  Patient On (Oxygen Delivery Method): room air      Gen: AAOx3, resting in bed  Abd: soft, NT, ND  Right hip:          MORIS Dressing: clean/dry/intact, flashing green/ok, no erythema or discharge noted  Bilateral LEs:         Sensation:  intact to light touch          Motor exam:  5/5 dorsiflexion/plantarflexion/EHL          2+ DP pulses          calf supple, NT         Abduction pillow in place         SCDs in place    LABS:                        7.2    6.98  )-----------( 179      ( 12 Dec 2023 08:09 )             22.7     12-12    135  |  102  |  31<H>  ----------------------------<  120<H>  4.5   |  27  |  1.68<H>    Ca    8.7      12 Dec 2023 08:09      PT/INR - ( 11 Dec 2023 11:32 )   PT: 11.8 sec;   INR: 1.06 ratio         PTT - ( 11 Dec 2023 11:32 )  PTT:32.5 sec        MEDICATIONS:  Anticoagulation:  aspirin enteric coated 81 milliGRAM(s) Oral every 12 hours --> changing to Eliquis 2.5mg q 12h due to increased Caprini Score      Pain medications:   acetaminophen     Tablet .. 500 milliGRAM(s) Oral every 8 hours  celecoxib 100 milliGRAM(s) Oral every 12 hours  gabapentin 300 milliGRAM(s) Oral three times a day  HYDROmorphone  Injectable 0.5 milliGRAM(s) IV Push every 3 hours PRN  oxyCODONE    IR 15 milliGRAM(s) Oral every 3 hours PRN  oxyCODONE    IR 10 milliGRAM(s) Oral every 3 hours PRN          A/P :68y Male   s/p Right Revision THR POD # 1  -    Pain control: Acetaminophen, Celebrex, Oxycodone, Dilaudid   -    Narcan upon D/C if going home due to concurrent Zolpidem as home med.  -    TOV this AM after removal of Doran this morning.   -    Cryotherapy with protective skin barrier  -    Incentive Spirometry  -    Anemia: getting 2 units due to low Hgb and soft BP. 2u PRBCs ordered.   -    Cr elevation- will monitor after 2u PRBCs, f/u am labs tomorrow. Patient needs Celebrex 100 q 12h for HO ppx (orthopedic surgeon confirmed)  -    DVT ppx:   Aspirin 81mg q 12 h --> changing to Eliquis 2.5mg q 12h due to increased Caprini Score,  and ambulation as tolerated  -    Weight bearing status: WBAT   -    Medicine Co-management- contacted regarding CKD and Anemia, and low BP. Patient is currently asymptomatic, but has a soft BP and likely will be symptomatic if he stands. Discussed administration of 2 units of blood with Hospitalist (Dr. Snyder) who agrees due to blood loss intra-op and low Hgb today.   -    Continue posterior total hip precautions  -    Physical Therapy- hold until after 2 units of blood. (2u PRBCs)  -    Occupational Therapy  -    ID consult. Intra-op Cultures NGTD  -    Discharge plan:   home vs [facility] pending PT evaluation, OT, medical clearance. ID consult still pending. May need authorization for Rehab.  POST OPERATIVE DAY #:  1  STATUS POST: Right Rev THR                       SUBJECTIVE: Patient seen and examined.  Patient did not get out of bed yet with PT and is pending full evaluation. PT on hold until after 2 units of PRBCs.  Patient had Doran post-op which was discontinued this morning. Trial of void this morning. Tolerating diet. Patient has had soft BP and Hgb is low. Patient is currently asymptomatic but will likely become symptomatic if he stands/does PT. PT on hold.   Reported Pain score =8/10, received pain medication prior to exam and states it is working. Patient takes narcotic regularly home and thus is on a higher regimen of narcotics post-op. Discussed multi-modal pain regimen with patient who expressed full understanding.   Denies: CP/SOB/N/V/Numbness/Tingling    OBJECTIVE:     Vital Signs Last 24 Hrs  T(C): 36.8 (12 Dec 2023 08:06), Max: 37.1 (11 Dec 2023 23:25)  T(F): 98.2 (12 Dec 2023 08:06), Max: 98.7 (11 Dec 2023 23:25)  HR: 80 (12 Dec 2023 08:06) (77 - 104)  BP: 96/58 (12 Dec 2023 08:06) (80/52 - 146/74)  RR: 18 (12 Dec 2023 08:06) (13 - 19)  SpO2: 97% (12 Dec 2023 08:06) (96% - 100%)    Parameters below as of 12 Dec 2023 08:06  Patient On (Oxygen Delivery Method): room air      Gen: AAOx3, resting in bed  Abd: soft, NT, ND  Right hip:          MORIS Dressing: clean/dry/intact, flashing green/ok, no erythema or discharge noted  Bilateral LEs:         Sensation:  intact to light touch          Motor exam:  5/5 dorsiflexion/plantarflexion/EHL          2+ DP pulses          calf supple, NT         Abduction pillow in place         SCDs in place    LABS:                        7.2    6.98  )-----------( 179      ( 12 Dec 2023 08:09 )             22.7     12-12    135  |  102  |  31<H>  ----------------------------<  120<H>  4.5   |  27  |  1.68<H>    Ca    8.7      12 Dec 2023 08:09      PT/INR - ( 11 Dec 2023 11:32 )   PT: 11.8 sec;   INR: 1.06 ratio         PTT - ( 11 Dec 2023 11:32 )  PTT:32.5 sec        MEDICATIONS:  Anticoagulation:  aspirin enteric coated 81 milliGRAM(s) Oral every 12 hours --> changing to Eliquis 2.5mg q 12h due to increased Caprini Score      Pain medications:   acetaminophen     Tablet .. 500 milliGRAM(s) Oral every 8 hours  celecoxib 100 milliGRAM(s) Oral every 12 hours  gabapentin 300 milliGRAM(s) Oral three times a day  HYDROmorphone  Injectable 0.5 milliGRAM(s) IV Push every 3 hours PRN  oxyCODONE    IR 15 milliGRAM(s) Oral every 3 hours PRN  oxyCODONE    IR 10 milliGRAM(s) Oral every 3 hours PRN          A/P :68y Male   s/p Right Revision THR POD # 1  -    Pain control: Acetaminophen, Celebrex, Oxycodone, Dilaudid   -    Narcan upon D/C if going home due to concurrent Zolpidem as home med.  -    TOV this AM after removal of Doran this morning.   -    Cryotherapy with protective skin barrier  -    Incentive Spirometry  -    Anemia: getting 2 units due to low Hgb and soft BP. 2u PRBCs ordered.   -    Cr elevation- will monitor after 2u PRBCs, f/u am labs tomorrow. Patient needs Celebrex 100 q 12h for HO ppx (orthopedic surgeon confirmed)  -    DVT ppx:   Aspirin 81mg q 12 h --> changing to Eliquis 2.5mg q 12h due to increased Caprini Score,  and ambulation as tolerated once cleared by PT.  -    Weight bearing status: WBAT   -    Medicine Co-management- contacted regarding CKD and Anemia, and low BP. Patient is currently asymptomatic, but has a soft BP and likely will be symptomatic if he stands. Discussed administration of 2 units of blood with Hospitalist (Dr. Snyder) who agrees due to blood loss intra-op and low Hgb today.   -    Continue posterior total hip precautions  -    Physical Therapy- hold until after 2 units of blood. (2u PRBCs)  -    Occupational Therapy  -    ID consult. Intra-op Cultures NGTD  -    Discharge plan:   home vs [facility] pending PT evaluation, OT, medical clearance. ID consult still pending. May need authorization for Rehab.

## 2023-12-12 NOTE — CARE COORDINATION ASSESSMENT. - NSCAREPROVIDERS_GEN_ALL_CORE_FT
CARE PROVIDERS:  Administration: Angelique Aldridge  Administration: Valery Finney  Administration: Wily Snyder  Admitting: Eli Watts  Attending: Eli Watts  Case Management: Priscilla Molina  Case Management: Santaromana, Anna  Consultant: Lorenzo Valladares  Consultant: Doron Melendez  Nurse: Tawana Quintanilla  Nurse: Howard Ramsey  Nurse: Coral Srivastava  Nurse: Rosa Sosa  Occupational Therapy: Anastasia Gauthier  Ordered: Alyssa Ovalle  Override: Howard Ramsey  Override: Sandhya Vee  PCA/Nursing Assistant: Dick Gipson  PCA/Nursing Assistant: Elzbieta Zamora  Physical Therapy: Norman Julien  Physical Therapy: Rhoda Wallace  Primary Team: Romario Neal  Primary Team: Marcus Odell  Primary Team: Chato Arboleda  Primary Team: Isaac Rinaldi  Primary Team: Darwin Mckay  Respiratory Therapy: Elayne Alcantar  : Rhina Mari  Team: CHIRAG  Hospitalists, Team  [facility]// Supp. Assoc.: Rosa Elena Aponte

## 2023-12-12 NOTE — DISCHARGE NOTE PROVIDER - PROVIDER TOKENS
PROVIDER:[TOKEN:[[id number]],SCHEDULEDAPPT:[12/26/2023],SCHEDULEDAPPTTIME:[03:45 PM],ESTABLISHEDPATIENT:[T]]

## 2023-12-12 NOTE — CARE COORDINATION ASSESSMENT. - NSPASTMEDSURGHISTORY_GEN_ALL_CORE_FT
PAST MEDICAL & SURGICAL HISTORY:  Syncopal episodes      Hyperlipidemia      Hypertension      Painful orthopaedic hardware      Osteoarthritis of right hip      S/P hernia repair      S/P appendectomy      Status post total replacement of right hip

## 2023-12-12 NOTE — DISCHARGE NOTE PROVIDER - NSDCMRMEDTOKEN_GEN_ALL_CORE_FT
Ambien 10 mg oral tablet: 1 tab(s) orally once a day (at bedtime) as needed for  insomnia  fenofibrate 160 mg oral tablet: 1 tab(s) orally once a day (in the evening)  gabapentin 300 mg oral capsule: 1 cap(s) orally 3 times a day  mupirocin 2% topical ointment: 1 application in each nostril 2 times a day  Elliot-Synephrine 12 Hour 0.05% nasal spray: 1 spray(s) in each nostril once a day (at bedtime)  oxyCODONE 5 mg oral tablet: 1 tab(s) orally every 6 hours as needed for  moderate pain  rosuvastatin 20 mg oral tablet: 1 tab(s) orally once a day (in the evening)  Tylenol 500 mg oral tablet: 2 tab(s) orally every 8 hours as needed for  moderate pain  valsartan-hydrochlorothiazide 160 mg-12.5 mg oral tablet: 1 tab(s) orally once a day   acetaminophen 500 mg oral tablet: 1 tab(s) orally every 8 hours  Ambien 10 mg oral tablet: 1 tab(s) orally once a day (at bedtime) as needed for  insomnia do not combine with narcotics. do not take within 2 hours of narcotic administration  CeleBREX 100 mg oral capsule: 1 cap(s) orally every 12 hours take 2 hours after aspirin  Eliquis 2.5 mg oral tablet: 1 tab(s) orally every 12 hours take for 28 days total after surgery  fenofibrate 160 mg oral tablet: 1 tab(s) orally once a day (in the evening)  gabapentin 300 mg oral capsule: 1 cap(s) orally 3 times a day  HYDROmorphone 4 mg oral tablet: 1 tab(s) orally every 6 hours as needed for  severe pain Max Dose is: 4 tabs per day. Do not take with other sedating medication or drive. [facility] : [phone] MDD: 4  Narcan 4 mg/0.1 mL nasal spray: 4 milligram(s) intranasally once as needed for narcotic overdose instill one spray into nostril as needed for drug overdose. May repeat dose in alternate nostril if needed in 2-3min while awaiting EMS.  Elliot-Synephrine 12 Hour 0.05% nasal spray: 1 spray(s) in each nostril once a day (at bedtime)  omeprazole 20 mg oral delayed release capsule: 1 cap(s) orally once a day prior to a meal (breakfast)  rosuvastatin 20 mg oral tablet: 1 tab(s) orally once a day (in the evening)  valsartan-hydrochlorothiazide 160 mg-12.5 mg oral tablet: 1 tab(s) orally once a day   acetaminophen 500 mg oral tablet: 1 tab(s) orally every 8 hours  Ambien 10 mg oral tablet: 1 tab(s) orally once a day (at bedtime) as needed for  insomnia do not combine with narcotics. do not take within 2 hours of narcotic administration  CeleBREX 100 mg oral capsule: 1 cap(s) orally every 12 hours take 2 hours after aspirin  Eliquis 2.5 mg oral tablet: 1 tab(s) orally every 12 hours take for 28 days total after surgery  fenofibrate 160 mg oral tablet: 1 tab(s) orally once a day (in the evening)  gabapentin 300 mg oral capsule: 1 cap(s) orally 3 times a day  HYDROmorphone 4 mg oral tablet: 1 tab(s) orally every 6 hours as needed for  severe pain Max Dose is: 4 tabs per day. Do not take with other sedating medication or drive. [facility] : [phone] MDD: 4  Narcan 4 mg/0.1 mL nasal spray: 4 milligram(s) intranasally once as needed for narcotic overdose instill one spray into nostril as needed for drug overdose. May repeat dose in alternate nostril if needed in 2-3min while awaiting EMS.  Elliot-Synephrine 12 Hour 0.05% nasal spray: 1 spray(s) in each nostril once a day (at bedtime)  omeprazole 20 mg oral delayed release capsule: 1 cap(s) orally once a day prior to a meal (breakfast)  rosuvastatin 20 mg oral tablet: 1 tab(s) orally once a day (in the evening)  valsartan-hydrochlorothiazide 160 mg-12.5 mg oral tablet: 1 tab(s) orally once a day

## 2023-12-12 NOTE — SBIRT NOTE ADULT - NSSBIRTALCPOSREINDET_GEN_A_CORE
Patient does not feel alcohol consumption is problematic at this time. Educated on mindful consumption. Patient has been abstaining from alcohol and plans to avoid after going home

## 2023-12-13 LAB
ALBUMIN SERPL ELPH-MCNC: 2.7 G/DL — LOW (ref 3.3–5)
ALBUMIN SERPL ELPH-MCNC: 2.7 G/DL — LOW (ref 3.3–5)
ALP SERPL-CCNC: 33 U/L — SIGNIFICANT CHANGE UP (ref 30–120)
ALP SERPL-CCNC: 33 U/L — SIGNIFICANT CHANGE UP (ref 30–120)
ALT FLD-CCNC: 26 U/L — SIGNIFICANT CHANGE UP (ref 10–60)
ALT FLD-CCNC: 26 U/L — SIGNIFICANT CHANGE UP (ref 10–60)
ANION GAP SERPL CALC-SCNC: 3 MMOL/L — LOW (ref 5–17)
ANION GAP SERPL CALC-SCNC: 3 MMOL/L — LOW (ref 5–17)
APTT BLD: 28.1 SEC — SIGNIFICANT CHANGE UP (ref 24.5–35.6)
APTT BLD: 28.1 SEC — SIGNIFICANT CHANGE UP (ref 24.5–35.6)
AST SERPL-CCNC: 25 U/L — SIGNIFICANT CHANGE UP (ref 10–40)
AST SERPL-CCNC: 25 U/L — SIGNIFICANT CHANGE UP (ref 10–40)
BILIRUB DIRECT SERPL-MCNC: 0 MG/DL — SIGNIFICANT CHANGE UP (ref 0–0.3)
BILIRUB DIRECT SERPL-MCNC: 0 MG/DL — SIGNIFICANT CHANGE UP (ref 0–0.3)
BILIRUB INDIRECT FLD-MCNC: 0.1 MG/DL — LOW (ref 0.2–1)
BILIRUB INDIRECT FLD-MCNC: 0.1 MG/DL — LOW (ref 0.2–1)
BILIRUB SERPL-MCNC: 0.1 MG/DL — LOW (ref 0.2–1.2)
BILIRUB SERPL-MCNC: 0.1 MG/DL — LOW (ref 0.2–1.2)
BUN SERPL-MCNC: 24 MG/DL — HIGH (ref 7–23)
BUN SERPL-MCNC: 24 MG/DL — HIGH (ref 7–23)
CALCIUM SERPL-MCNC: 9 MG/DL — SIGNIFICANT CHANGE UP (ref 8.4–10.5)
CALCIUM SERPL-MCNC: 9 MG/DL — SIGNIFICANT CHANGE UP (ref 8.4–10.5)
CHLORIDE SERPL-SCNC: 105 MMOL/L — SIGNIFICANT CHANGE UP (ref 96–108)
CHLORIDE SERPL-SCNC: 105 MMOL/L — SIGNIFICANT CHANGE UP (ref 96–108)
CO2 SERPL-SCNC: 30 MMOL/L — SIGNIFICANT CHANGE UP (ref 22–31)
CO2 SERPL-SCNC: 30 MMOL/L — SIGNIFICANT CHANGE UP (ref 22–31)
CREAT SERPL-MCNC: 1.68 MG/DL — HIGH (ref 0.5–1.3)
CREAT SERPL-MCNC: 1.68 MG/DL — HIGH (ref 0.5–1.3)
EGFR: 44 ML/MIN/1.73M2 — LOW
EGFR: 44 ML/MIN/1.73M2 — LOW
GLUCOSE SERPL-MCNC: 123 MG/DL — HIGH (ref 70–99)
GLUCOSE SERPL-MCNC: 123 MG/DL — HIGH (ref 70–99)
HCT VFR BLD CALC: 25.4 % — LOW (ref 39–50)
HCT VFR BLD CALC: 25.4 % — LOW (ref 39–50)
HGB BLD-MCNC: 8.2 G/DL — LOW (ref 13–17)
HGB BLD-MCNC: 8.2 G/DL — LOW (ref 13–17)
INR BLD: 1.02 RATIO — SIGNIFICANT CHANGE UP (ref 0.85–1.18)
INR BLD: 1.02 RATIO — SIGNIFICANT CHANGE UP (ref 0.85–1.18)
MCHC RBC-ENTMCNC: 30.4 PG — SIGNIFICANT CHANGE UP (ref 27–34)
MCHC RBC-ENTMCNC: 30.4 PG — SIGNIFICANT CHANGE UP (ref 27–34)
MCHC RBC-ENTMCNC: 32.3 GM/DL — SIGNIFICANT CHANGE UP (ref 32–36)
MCHC RBC-ENTMCNC: 32.3 GM/DL — SIGNIFICANT CHANGE UP (ref 32–36)
MCV RBC AUTO: 94.1 FL — SIGNIFICANT CHANGE UP (ref 80–100)
MCV RBC AUTO: 94.1 FL — SIGNIFICANT CHANGE UP (ref 80–100)
NRBC # BLD: 0 /100 WBCS — SIGNIFICANT CHANGE UP (ref 0–0)
NRBC # BLD: 0 /100 WBCS — SIGNIFICANT CHANGE UP (ref 0–0)
PLATELET # BLD AUTO: 172 K/UL — SIGNIFICANT CHANGE UP (ref 150–400)
PLATELET # BLD AUTO: 172 K/UL — SIGNIFICANT CHANGE UP (ref 150–400)
POTASSIUM SERPL-MCNC: 4 MMOL/L — SIGNIFICANT CHANGE UP (ref 3.5–5.3)
POTASSIUM SERPL-MCNC: 4 MMOL/L — SIGNIFICANT CHANGE UP (ref 3.5–5.3)
POTASSIUM SERPL-SCNC: 4 MMOL/L — SIGNIFICANT CHANGE UP (ref 3.5–5.3)
POTASSIUM SERPL-SCNC: 4 MMOL/L — SIGNIFICANT CHANGE UP (ref 3.5–5.3)
PROT SERPL-MCNC: 5.5 G/DL — LOW (ref 6–8.3)
PROT SERPL-MCNC: 5.5 G/DL — LOW (ref 6–8.3)
PROTHROM AB SERPL-ACNC: 11.1 SEC — SIGNIFICANT CHANGE UP (ref 9.5–13)
PROTHROM AB SERPL-ACNC: 11.1 SEC — SIGNIFICANT CHANGE UP (ref 9.5–13)
RBC # BLD: 2.7 M/UL — LOW (ref 4.2–5.8)
RBC # BLD: 2.7 M/UL — LOW (ref 4.2–5.8)
RBC # FLD: 15.9 % — HIGH (ref 10.3–14.5)
RBC # FLD: 15.9 % — HIGH (ref 10.3–14.5)
SODIUM SERPL-SCNC: 138 MMOL/L — SIGNIFICANT CHANGE UP (ref 135–145)
SODIUM SERPL-SCNC: 138 MMOL/L — SIGNIFICANT CHANGE UP (ref 135–145)
WBC # BLD: 7.28 K/UL — SIGNIFICANT CHANGE UP (ref 3.8–10.5)
WBC # BLD: 7.28 K/UL — SIGNIFICANT CHANGE UP (ref 3.8–10.5)
WBC # FLD AUTO: 7.28 K/UL — SIGNIFICANT CHANGE UP (ref 3.8–10.5)
WBC # FLD AUTO: 7.28 K/UL — SIGNIFICANT CHANGE UP (ref 3.8–10.5)

## 2023-12-13 PROCEDURE — 99232 SBSQ HOSP IP/OBS MODERATE 35: CPT

## 2023-12-13 RX ORDER — GABAPENTIN 400 MG/1
300 CAPSULE ORAL ONCE
Refills: 0 | Status: COMPLETED | OUTPATIENT
Start: 2023-12-13 | End: 2023-12-13

## 2023-12-13 RX ORDER — HYDROMORPHONE HYDROCHLORIDE 2 MG/ML
6 INJECTION INTRAMUSCULAR; INTRAVENOUS; SUBCUTANEOUS
Refills: 0 | Status: DISCONTINUED | OUTPATIENT
Start: 2023-12-13 | End: 2023-12-15

## 2023-12-13 RX ORDER — HYDROMORPHONE HYDROCHLORIDE 2 MG/ML
4 INJECTION INTRAMUSCULAR; INTRAVENOUS; SUBCUTANEOUS
Refills: 0 | Status: DISCONTINUED | OUTPATIENT
Start: 2023-12-13 | End: 2023-12-15

## 2023-12-13 RX ADMIN — GABAPENTIN 300 MILLIGRAM(S): 400 CAPSULE ORAL at 05:22

## 2023-12-13 RX ADMIN — OXYCODONE HYDROCHLORIDE 15 MILLIGRAM(S): 5 TABLET ORAL at 00:33

## 2023-12-13 RX ADMIN — HYDROMORPHONE HYDROCHLORIDE 6 MILLIGRAM(S): 2 INJECTION INTRAMUSCULAR; INTRAVENOUS; SUBCUTANEOUS at 10:16

## 2023-12-13 RX ADMIN — OXYCODONE HYDROCHLORIDE 15 MILLIGRAM(S): 5 TABLET ORAL at 03:39

## 2023-12-13 RX ADMIN — Medication 500 MILLIGRAM(S): at 00:59

## 2023-12-13 RX ADMIN — APIXABAN 2.5 MILLIGRAM(S): 2.5 TABLET, FILM COATED ORAL at 09:28

## 2023-12-13 RX ADMIN — Medication 500 MILLIGRAM(S): at 22:29

## 2023-12-13 RX ADMIN — ZOLPIDEM TARTRATE 5 MILLIGRAM(S): 10 TABLET ORAL at 00:33

## 2023-12-13 RX ADMIN — Medication 500 MILLIGRAM(S): at 17:07

## 2023-12-13 RX ADMIN — PANTOPRAZOLE SODIUM 40 MILLIGRAM(S): 20 TABLET, DELAYED RELEASE ORAL at 05:23

## 2023-12-13 RX ADMIN — HYDROMORPHONE HYDROCHLORIDE 6 MILLIGRAM(S): 2 INJECTION INTRAMUSCULAR; INTRAVENOUS; SUBCUTANEOUS at 15:10

## 2023-12-13 RX ADMIN — GABAPENTIN 300 MILLIGRAM(S): 400 CAPSULE ORAL at 14:39

## 2023-12-13 RX ADMIN — Medication 500 MILLIGRAM(S): at 22:28

## 2023-12-13 RX ADMIN — HYDROMORPHONE HYDROCHLORIDE 6 MILLIGRAM(S): 2 INJECTION INTRAMUSCULAR; INTRAVENOUS; SUBCUTANEOUS at 21:41

## 2023-12-13 RX ADMIN — Medication 145 MILLIGRAM(S): at 21:35

## 2023-12-13 RX ADMIN — Medication 100 MILLIGRAM(S): at 22:09

## 2023-12-13 RX ADMIN — APIXABAN 2.5 MILLIGRAM(S): 2.5 TABLET, FILM COATED ORAL at 21:35

## 2023-12-13 RX ADMIN — Medication 500 MILLIGRAM(S): at 09:39

## 2023-12-13 RX ADMIN — HYDROMORPHONE HYDROCHLORIDE 6 MILLIGRAM(S): 2 INJECTION INTRAMUSCULAR; INTRAVENOUS; SUBCUTANEOUS at 18:31

## 2023-12-13 RX ADMIN — HYDROMORPHONE HYDROCHLORIDE 6 MILLIGRAM(S): 2 INJECTION INTRAMUSCULAR; INTRAVENOUS; SUBCUTANEOUS at 22:30

## 2023-12-13 RX ADMIN — OXYCODONE HYDROCHLORIDE 15 MILLIGRAM(S): 5 TABLET ORAL at 01:33

## 2023-12-13 RX ADMIN — Medication 100 MILLIGRAM(S): at 05:22

## 2023-12-13 RX ADMIN — CELECOXIB 100 MILLIGRAM(S): 200 CAPSULE ORAL at 09:29

## 2023-12-13 RX ADMIN — OXYCODONE HYDROCHLORIDE 15 MILLIGRAM(S): 5 TABLET ORAL at 04:30

## 2023-12-13 RX ADMIN — HYDROMORPHONE HYDROCHLORIDE 6 MILLIGRAM(S): 2 INJECTION INTRAMUSCULAR; INTRAVENOUS; SUBCUTANEOUS at 09:29

## 2023-12-13 RX ADMIN — HYDROMORPHONE HYDROCHLORIDE 6 MILLIGRAM(S): 2 INJECTION INTRAMUSCULAR; INTRAVENOUS; SUBCUTANEOUS at 14:40

## 2023-12-13 RX ADMIN — CELECOXIB 100 MILLIGRAM(S): 200 CAPSULE ORAL at 09:39

## 2023-12-13 RX ADMIN — HYDROMORPHONE HYDROCHLORIDE 6 MILLIGRAM(S): 2 INJECTION INTRAMUSCULAR; INTRAVENOUS; SUBCUTANEOUS at 19:17

## 2023-12-13 RX ADMIN — GABAPENTIN 300 MILLIGRAM(S): 400 CAPSULE ORAL at 22:28

## 2023-12-13 RX ADMIN — Medication 500 MILLIGRAM(S): at 09:28

## 2023-12-13 RX ADMIN — Medication 500 MILLIGRAM(S): at 17:00

## 2023-12-13 RX ADMIN — Medication 100 MILLIGRAM(S): at 14:40

## 2023-12-13 RX ADMIN — SENNA PLUS 2 TABLET(S): 8.6 TABLET ORAL at 21:34

## 2023-12-13 RX ADMIN — Medication 500 MILLIGRAM(S): at 00:33

## 2023-12-13 RX ADMIN — POLYETHYLENE GLYCOL 3350 17 GRAM(S): 17 POWDER, FOR SOLUTION ORAL at 22:10

## 2023-12-13 NOTE — OCCUPATIONAL THERAPY INITIAL EVALUATION ADULT - ADL RETRAINING, OT EVAL
Pt will complete LB dressing with AE as needed while maintained posterior THR precautions with MinAx1 by 2-3 sessions. Pt will complete grooming activity while standing at the sink with modified independence by 2-3 sessions.

## 2023-12-13 NOTE — PHYSICAL THERAPY INITIAL EVALUATION ADULT - ADDITIONAL COMMENTS
admission
Pt lives in apartment with wife. No stairs to enter, none to negotiate within. Pt does not own any DME, Pt drives.

## 2023-12-13 NOTE — OCCUPATIONAL THERAPY INITIAL EVALUATION ADULT - BED MOBILITY TRAINING, PT EVAL
Pt will complete supine <> sit with modified independence in prep for ADLs out of bed by 2-3 sessions.

## 2023-12-13 NOTE — PHYSICAL THERAPY INITIAL EVALUATION ADULT - THERAPY FREQUENCY, PT EVAL
[address]  [phone]  1936    EGD DISCHARGE INSTRUCTIONS  Discomfort:  Sore throat- throat lozenges or warm salt water gargle  redness at IV site- apply warm compress to area; if redness or soreness persist- contact your physician  Gaseous discomfort- walking, belching will help relieve any discomfort    DIET  You may resume your regular diet - however -  remember your colon is empty and a heavy meal will produce gas. Avoid these foods:  vegetables, fried / greasy foods, carbonated drinks  You may not drink alcoholic beverages for at least 12 hours    MEDICATIONS   Regarding Aspirin or Nonsteroidal medications specifically, please see below. ACTIVITY  You may resume your normal daily activities. Spend the remainder of the day resting -  avoid any strenuous activity. You may not operate a vehicle for 12 hours  You may not engage in an occupation involving machinery or appliances for rest of today. Avoid making any critical decisions for at least 24 hour    CALL M.D. ANY SIGN OF   Increasing pain, nausea, vomiting  Abdominal distension (swelling)  New increased bleeding (oral or rectal)  Fever (chills)  Pain in chest area  Bloody discharge from nose or mouth  Shortness of breath    You may not  take any Advil, Aspirin, Ibuprofen, Motrin, Aleve, or Goodys for 10 days, ONLY  Tylenol as needed for pain.     Post procedure diagnosis: esophagitis, gastritis      Follow-up Instructions:   Call Dr. Elmo Pickett  Results of procedure / biopsy in 10 days, take Pantoprazole as prescribed  Telephone #  [phone]        DISCHARGE SUMMARY from Nurse    The following personal items collected during your admission are returned to you:   Dental Appliance: Dental Appliances: None  Vision: Visual Aid: None  Hearing Aid:    Jewelry:    Clothing:    Other Valuables:    Valuables sent to safe:
BID/daily

## 2023-12-13 NOTE — PHYSICAL THERAPY INITIAL EVALUATION ADULT - PERTINENT HX OF CURRENT PROBLEM, REHAB EVAL
68yo male patient who states that he has Right Total Hip Replacement in 2006. He has been unable to lie on his right side since that time. He has had increased pain since the summer. He has had arthrocentesis several weeks ago, but states that pain is increased since that time. He rates the pain at 9/10 and he is taking Oxycodone or Tylenol prn with minimal relief. Revision has been recommended and he presents today for PSTs

## 2023-12-13 NOTE — SOCIAL WORK PROGRESS NOTE - NSSWPROGRESSNOTE_GEN_ALL_CORE
OSVALDO called [employer] to open case for KI however, SW was on hold for extended period of time and then got disconnected.  OSVALDO faxed request to [employer].  OSVALDO to follow.  OSVALDO called [employer] to open case for KI however, SW was on hold for extended period of time and then got disconnected.  OSVALDO faxed request to [employer].  OSVALDO to follow.

## 2023-12-13 NOTE — PROGRESS NOTE ADULT - SUBJECTIVE AND OBJECTIVE BOX
MODESTO MORRELL is a [address] [place] [address] , patient examined and chart reviewed.       INTERVAL HPI/ OVERNIGHT EVENTS:   Afebrile. C/o right hip pain.    PAST MEDICAL & SURGICAL HISTORY:  Osteoarthritis of right hip  Painful orthopaedic hardware  Hypertension  Hyperlipidemia  Syncopal episodes  Status post total replacement of right hip  S/P appendectomy  S/P hernia repair      For details regarding the patient's social history, family history, and other miscellaneous elements, please refer the initial infectious diseases consultation and/or the admitting history and physical examination for this admission.    ROS:  CONSTITUTIONAL:  Negative fever or chills  EYES:  Negative  blurry vision or double vision  CARDIOVASCULAR:  Negative for chest pain or palpitations  RESPIRATORY:  Negative for cough, wheezing, or SOB   GASTROINTESTINAL:  Negative for nausea, vomiting, diarrhea, constipation, or abdominal pain  GENITOURINARY:  Negative frequency, urgency or dysuria  NEUROLOGIC:  No headache, confusion, dizziness, lightheadedness  All other systems were reviewed and are negative     No Known Allergies      Current inpatient medications :    ANTIBIOTICS/RELEVANT:  ceFAZolin   IVPB      ceFAZolin   IVPB 2000 milliGRAM(s) IV Intermittent every 8 hours      acetaminophen     Tablet .. 500 milliGRAM(s) Oral every 8 hours  apixaban 2.5 milliGRAM(s) Oral every 12 hours  fenofibrate Tablet 145 milliGRAM(s) Oral at bedtime  gabapentin 300 milliGRAM(s) Oral three times a day  HYDROmorphone   Tablet 6 milliGRAM(s) Oral every 3 hours PRN  HYDROmorphone   Tablet 4 milliGRAM(s) Oral every 3 hours PRN  HYDROmorphone  Injectable 0.5 milliGRAM(s) IV Push every 3 hours PRN  lactated ringers. 1000 milliLiter(s) IV Continuous <Continuous>  magnesium hydroxide Suspension 30 milliLiter(s) Oral daily PRN  ondansetron Injectable 4 milliGRAM(s) IV Push every 6 hours PRN  pantoprazole    Tablet 40 milliGRAM(s) Oral before breakfast  polyethylene glycol 3350 17 Gram(s) Oral at bedtime  senna 2 Tablet(s) Oral at bedtime  valsartan 160 milliGRAM(s) Oral daily  zolpidem 5 milliGRAM(s) Oral at bedtime PRN  zolpidem 5 milliGRAM(s) Oral at bedtime PRN      Objective:    12-12 @ 07:01  -  12-13 @ 07:00  --------------------------------------------------------  IN: 0 mL / OUT: 1800 mL / NET: -1800 mL      T(C): 36.9 (12-13-23 @ 07:55), Max: 36.9 (12-13-23 @ 07:55)  HR: 72 (12-13-23 @ 09:50) (66 - 78)  BP: 123/71 (12-13-23 @ 09:50) (100/55 - 123/71)  RR: 18 (12-13-23 @ 07:55) (17 - 19)  SpO2: 96% (12-13-23 @ 09:50) (95% - 97%)  Wt(kg): --      Physical Exam:  General: well developed well nourished, in no acute distress  Neck: supple, trachea midline  Lungs: clear, no wheeze/rhonchi  Cardiovascular: regular rate and rhythm, S1 S2  Abdomen: soft, nontender,  bowel sounds normal  Neurological: alert and oriented x3  Skin: no rash  Extremities: Right hip surgical site c/d/i          LABS:                          8.2    7.28  )-----------( 172      ( 13 Dec 2023 06:30 )             25.4       12-13    138  |  105  |  24<H>  ----------------------------<  123<H>  4.0   |  30  |  1.68<H>    Ca    9.0      13 Dec 2023 06:30    TPro  5.5<L>  /  Alb  2.7<L>  /  TBili  0.1<L>  /  DBili  0.0  /  AST  25  /  ALT  26  /  AlkPhos  33  12-13      PT/INR - ( 13 Dec 2023 06:30 )   PT: 11.1 sec;   INR: 1.02 ratio         PTT - ( 13 Dec 2023 06:30 )  PTT:28.1 sec      MICROBIOLOGY:    Culture - Joint (collected 11 Dec 2023 14:01)  Source: Hip RIGHT HIP JOINT 5  Gram Stain (12 Dec 2023 00:52):    No polymorphonuclear leukocytes seen per low power field    No organisms seen per oil power field  Preliminary Report (12 Dec 2023 18:41):    No growth to date.    Culture - Joint (collected 11 Dec 2023 14:01)  Source: Hip RIGHT HIP JOINT 4  Gram Stain (12 Dec 2023 00:51):    No polymorphonuclear leukocytes seen per low power field    No organisms seen per oil power field  Preliminary Report (12 Dec 2023 18:40):    No growth to date.    Culture - Joint (collected 11 Dec 2023 14:01)  Source: Hip RIGHT HIP JOINT 3  Gram Stain (12 Dec 2023 00:53):    No polymorphonuclear leukocytes seen per low power field    No organisms seen per oil power field  Preliminary Report (12 Dec 2023 18:37):    No growth to date.    Culture - Joint (collected 11 Dec 2023 14:01)  Source: Hip RIGHT HIP JOINT 2  Gram Stain (12 Dec 2023 00:52):    No polymorphonuclear leukocytes seen per low power field    No organisms seen per oil power field  Preliminary Report (12 Dec 2023 18:34):    No growth to date.    Culture - Joint (collected 11 Dec 2023 14:01)  Source: Hip RIGHT HIP JOINT 1  Gram Stain (12 Dec 2023 00:52):    No polymorphonuclear leukocytes seen per low power field    No organisms seen per oil power field  Preliminary Report (12 Dec 2023 18:42):    No growth to date.    Culture - Joint (collected 11 Dec 2023 14:01)  Source: Joint Fl Joint Fluid  Gram Stain (12 Dec 2023 00:54):    No polymorphonuclear leukocytes seen per low power field    No organisms seen per oil power field  Preliminary Report (12 Dec 2023 18:57):    No growth to date.      RADIOLOGY & ADDITIONAL STUDIES:          Assessment :  68YO M PMH Right Total Hip Replacement in 2006 and has been unable to lie on his right side since that time with increasing discomfort and pain. He has had arthrocentesis several weeks ago. PT denies fever chills n/v/d CP SOB. Denies fall trauma. Seen by Dr Watts outpt and revision was recommended.  Sp Partial hip revision, acetabular component only 11-Dec-2023· Surgeon noted reactive inflammatory tissue reaction.   Rule out prosthetic joint infection though suspicion is low  OR cultures NGTD    Plan :   Empiric Ancef  Fu OR cultures- if neg x 72 hours then dc antibiotics  Trend temps and cbc  Pulm toileting  Increase activity  Stable from ID standpoint    Continue with present regiment.  Appropriate use of antibiotics and adverse effects reviewed.      I have discussed the above plan of care with patient in detail. He expressed understanding of the  treatment plan . Risks, benefits and alternatives discussed in detail. I have asked if he has any questions or concerns and appropriately addressed them to the best of my ability .    > 35 minutes were spent in direct patient care reviewing notes, medications ,labs data/ imaging , discussion with multidisciplinary team.    Thank you for allowing me to participate in care of your patient .    Tg Bruno MD  Infectious Disease  [phone]   MODESTO MORRELL is a [address] [place] [address] , patient examined and chart reviewed.       INTERVAL HPI/ OVERNIGHT EVENTS:   Afebrile. C/o right hip pain.    PAST MEDICAL & SURGICAL HISTORY:  Osteoarthritis of right hip  Painful orthopaedic hardware  Hypertension  Hyperlipidemia  Syncopal episodes  Status post total replacement of right hip  S/P appendectomy  S/P hernia repair      For details regarding the patient's social history, family history, and other miscellaneous elements, please refer the initial infectious diseases consultation and/or the admitting history and physical examination for this admission.    ROS:  CONSTITUTIONAL:  Negative fever or chills  EYES:  Negative  blurry vision or double vision  CARDIOVASCULAR:  Negative for chest pain or palpitations  RESPIRATORY:  Negative for cough, wheezing, or SOB   GASTROINTESTINAL:  Negative for nausea, vomiting, diarrhea, constipation, or abdominal pain  GENITOURINARY:  Negative frequency, urgency or dysuria  NEUROLOGIC:  No headache, confusion, dizziness, lightheadedness  All other systems were reviewed and are negative     No Known Allergies      Current inpatient medications :    ANTIBIOTICS/RELEVANT:  ceFAZolin   IVPB      ceFAZolin   IVPB 2000 milliGRAM(s) IV Intermittent every 8 hours      acetaminophen     Tablet .. 500 milliGRAM(s) Oral every 8 hours  apixaban 2.5 milliGRAM(s) Oral every 12 hours  fenofibrate Tablet 145 milliGRAM(s) Oral at bedtime  gabapentin 300 milliGRAM(s) Oral three times a day  HYDROmorphone   Tablet 6 milliGRAM(s) Oral every 3 hours PRN  HYDROmorphone   Tablet 4 milliGRAM(s) Oral every 3 hours PRN  HYDROmorphone  Injectable 0.5 milliGRAM(s) IV Push every 3 hours PRN  lactated ringers. 1000 milliLiter(s) IV Continuous <Continuous>  magnesium hydroxide Suspension 30 milliLiter(s) Oral daily PRN  ondansetron Injectable 4 milliGRAM(s) IV Push every 6 hours PRN  pantoprazole    Tablet 40 milliGRAM(s) Oral before breakfast  polyethylene glycol 3350 17 Gram(s) Oral at bedtime  senna 2 Tablet(s) Oral at bedtime  valsartan 160 milliGRAM(s) Oral daily  zolpidem 5 milliGRAM(s) Oral at bedtime PRN  zolpidem 5 milliGRAM(s) Oral at bedtime PRN      Objective:    12-12 @ 07:01  -  12-13 @ 07:00  --------------------------------------------------------  IN: 0 mL / OUT: 1800 mL / NET: -1800 mL      T(C): 36.9 (12-13-23 @ 07:55), Max: 36.9 (12-13-23 @ 07:55)  HR: 72 (12-13-23 @ 09:50) (66 - 78)  BP: 123/71 (12-13-23 @ 09:50) (100/55 - 123/71)  RR: 18 (12-13-23 @ 07:55) (17 - 19)  SpO2: 96% (12-13-23 @ 09:50) (95% - 97%)  Wt(kg): --      Physical Exam:  General: well developed well nourished, in no acute distress  Neck: supple, trachea midline  Lungs: clear, no wheeze/rhonchi  Cardiovascular: regular rate and rhythm, S1 S2  Abdomen: soft, nontender,  bowel sounds normal  Neurological: alert and oriented x3  Skin: no rash  Extremities: Right hip surgical site c/d/i          LABS:                          8.2    7.28  )-----------( 172      ( 13 Dec 2023 06:30 )             25.4       12-13    138  |  105  |  24<H>  ----------------------------<  123<H>  4.0   |  30  |  1.68<H>    Ca    9.0      13 Dec 2023 06:30    TPro  5.5<L>  /  Alb  2.7<L>  /  TBili  0.1<L>  /  DBili  0.0  /  AST  25  /  ALT  26  /  AlkPhos  33  12-13      PT/INR - ( 13 Dec 2023 06:30 )   PT: 11.1 sec;   INR: 1.02 ratio         PTT - ( 13 Dec 2023 06:30 )  PTT:28.1 sec      MICROBIOLOGY:    Culture - Joint (collected 11 Dec 2023 14:01)  Source: Hip RIGHT HIP JOINT 5  Gram Stain (12 Dec 2023 00:52):    No polymorphonuclear leukocytes seen per low power field    No organisms seen per oil power field  Preliminary Report (12 Dec 2023 18:41):    No growth to date.    Culture - Joint (collected 11 Dec 2023 14:01)  Source: Hip RIGHT HIP JOINT 4  Gram Stain (12 Dec 2023 00:51):    No polymorphonuclear leukocytes seen per low power field    No organisms seen per oil power field  Preliminary Report (12 Dec 2023 18:40):    No growth to date.    Culture - Joint (collected 11 Dec 2023 14:01)  Source: Hip RIGHT HIP JOINT 3  Gram Stain (12 Dec 2023 00:53):    No polymorphonuclear leukocytes seen per low power field    No organisms seen per oil power field  Preliminary Report (12 Dec 2023 18:37):    No growth to date.    Culture - Joint (collected 11 Dec 2023 14:01)  Source: Hip RIGHT HIP JOINT 2  Gram Stain (12 Dec 2023 00:52):    No polymorphonuclear leukocytes seen per low power field    No organisms seen per oil power field  Preliminary Report (12 Dec 2023 18:34):    No growth to date.    Culture - Joint (collected 11 Dec 2023 14:01)  Source: Hip RIGHT HIP JOINT 1  Gram Stain (12 Dec 2023 00:52):    No polymorphonuclear leukocytes seen per low power field    No organisms seen per oil power field  Preliminary Report (12 Dec 2023 18:42):    No growth to date.    Culture - Joint (collected 11 Dec 2023 14:01)  Source: Joint Fl Joint Fluid  Gram Stain (12 Dec 2023 00:54):    No polymorphonuclear leukocytes seen per low power field    No organisms seen per oil power field  Preliminary Report (12 Dec 2023 18:57):    No growth to date.      RADIOLOGY & ADDITIONAL STUDIES:          Assessment :  66YO M PMH Right Total Hip Replacement in 2006 and has been unable to lie on his right side since that time with increasing discomfort and pain. He has had arthrocentesis several weeks ago. PT denies fever chills n/v/d CP SOB. Denies fall trauma. Seen by Dr Watts outpt and revision was recommended.  Sp Partial hip revision, acetabular component only 11-Dec-2023· Surgeon noted reactive inflammatory tissue reaction.   Rule out prosthetic joint infection though suspicion is low  OR cultures NGTD    Plan :   Empiric Ancef  Fu OR cultures- if neg x 72 hours then dc antibiotics  Trend temps and cbc  Pulm toileting  Increase activity  Stable from ID standpoint    Continue with present regiment.  Appropriate use of antibiotics and adverse effects reviewed.      I have discussed the above plan of care with patient in detail. He expressed understanding of the  treatment plan . Risks, benefits and alternatives discussed in detail. I have asked if he has any questions or concerns and appropriately addressed them to the best of my ability .    > 35 minutes were spent in direct patient care reviewing notes, medications ,labs data/ imaging , discussion with multidisciplinary team.    Thank you for allowing me to participate in care of your patient .    Tg Bruno MD  Infectious Disease  [phone]

## 2023-12-13 NOTE — OCCUPATIONAL THERAPY INITIAL EVALUATION ADULT - LIVES WITH, PROFILE
Pt lives in a private home with his wife with no [place] and no stairs inside. Pt has a walk-in shower and reports he was independent with all ADLs and still driving PTA./spouse

## 2023-12-13 NOTE — PROGRESS NOTE ADULT - ASSESSMENT
67 yo male, pmh of htn, hld, OA, CKD with failed right hip arthroplasty, presenting for revision  - pain control, dvt ppx as per ortho  - s/p 2 units prbc, hgb increased to 8.2, BP now normotensive  - hold losartan/hctz  - creatinine at 1.6 no change, appears to be patient's baseline  - OR cultures no growth to date, can likely stop abx  - no medical contraindication to DC

## 2023-12-13 NOTE — PHYSICAL THERAPY INITIAL EVALUATION ADULT - PRECAUTIONS/LIMITATIONS, REHAB EVAL
No hip flex past 90, internal rotation, adduction./fall precautions/right hip precautions/surgical precautions

## 2023-12-13 NOTE — PROGRESS NOTE ADULT - SUBJECTIVE AND OBJECTIVE BOX
Patient is a 68y old  Male who presents with a chief complaint of right hip arthroplasty revision (12 Dec 2023 12:36)      INTERVAL HPI/OVERNIGHT EVENTS:  Patient seen and examined in am, having some hip pain, some improvement with oxycodone. No current dizziness, had some weakness ambulating with pt. no nausea, vomiting, fever, chills.     ROS reviewed and is otherwise negative        Vital Signs Last 24 Hrs  T(C): 36.9 (13 Dec 2023 07:55), Max: 36.9 (13 Dec 2023 07:55)  T(F): 98.4 (13 Dec 2023 07:55), Max: 98.4 (13 Dec 2023 07:55)  HR: 72 (13 Dec 2023 09:50) (66 - 78)  BP: 123/71 (13 Dec 2023 09:50) (100/55 - 123/71)  BP(mean): 85 (12 Dec 2023 23:30) (85 - 85)  RR: 18 (13 Dec 2023 07:55) (17 - 19)  SpO2: 96% (13 Dec 2023 09:50) (95% - 97%)    Parameters below as of 13 Dec 2023 09:50  Patient On (Oxygen Delivery Method): room air        PHYSICAL EXAM:  GENERAL: NAD, weak, older male  HEAD:  Atraumatic, Normocephalic  EYES: EOMI, PERRLA  ENMT: Moist mucous membranes, No lesions   NECK: Supple, No JVD   NERVOUS SYSTEM:  Alert & Oriented X3, Good concentration; All 4 extremities mobile, no gross sensory deficits.   CHEST/LUNG: Clear to auscultation bilaterally; No rales, rhonchi, wheezing, or rubs  HEART: Regular rate and rhythm; No murmurs, rubs, or gallops  ABDOMEN: Soft, Nontender, Nondistended; Bowel sounds present  EXTREMITIES:  + Pulses, hip site c/d/i  SKIN: No rashes or lesions    MEDICATIONS  (STANDING):  acetaminophen     Tablet .. 500 milliGRAM(s) Oral every 8 hours  apixaban 2.5 milliGRAM(s) Oral every 12 hours  ceFAZolin   IVPB      ceFAZolin   IVPB 2000 milliGRAM(s) IV Intermittent every 8 hours  fenofibrate Tablet 145 milliGRAM(s) Oral at bedtime  gabapentin 300 milliGRAM(s) Oral three times a day  lactated ringers. 1000 milliLiter(s) (100 mL/Hr) IV Continuous <Continuous>  pantoprazole    Tablet 40 milliGRAM(s) Oral before breakfast  polyethylene glycol 3350 17 Gram(s) Oral at bedtime  senna 2 Tablet(s) Oral at bedtime  valsartan 160 milliGRAM(s) Oral daily    MEDICATIONS  (PRN):  HYDROmorphone   Tablet 4 milliGRAM(s) Oral every 3 hours PRN Moderate Pain (4 - 6)  HYDROmorphone   Tablet 6 milliGRAM(s) Oral every 3 hours PRN Severe Pain (7 - 10)  HYDROmorphone  Injectable 0.5 milliGRAM(s) IV Push every 3 hours PRN Breakthrough pain  magnesium hydroxide Suspension 30 milliLiter(s) Oral daily PRN Constipation  ondansetron Injectable 4 milliGRAM(s) IV Push every 6 hours PRN Nausea and/or Vomiting  zolpidem 5 milliGRAM(s) Oral at bedtime PRN Insomnia  zolpidem 5 milliGRAM(s) Oral at bedtime PRN Insomnia      Allergies    No Known Allergies    Intolerances          LABS:                        8.2    7.28  )-----------( 172      ( 13 Dec 2023 06:30 )             25.4     13 Dec 2023 06:30    138    |  105    |  24     ----------------------------<  123    4.0     |  30     |  1.68     Ca    9.0        13 Dec 2023 06:30    TPro  5.5    /  Alb  2.7    /  TBili  0.1    /  DBili  0.0    /  AST  25     /  ALT  26     /  AlkPhos  33     13 Dec 2023 06:30    PT/INR - ( 13 Dec 2023 06:30 )   PT: 11.1 sec;   INR: 1.02 ratio         PTT - ( 13 Dec 2023 06:30 )  PTT:28.1 sec  Urinalysis Basic - ( 13 Dec 2023 06:30 )    Color: x / Appearance: x / SG: x / pH: x  Gluc: 123 mg/dL / Ketone: x  / Bili: x / Urobili: x   Blood: x / Protein: x / Nitrite: x   Leuk Esterase: x / RBC: x / WBC x   Sq Epi: x / Non Sq Epi: x / Bacteria: x      CAPILLARY BLOOD GLUCOSE          RADIOLOGY & ADDITIONAL TESTS:        Care Discussed with Consultants/Other Providers:    Advanced Directives: [ ] DNR  [ ] No feeding tube  [ ] MOLST in chart  [ ] MOLST completed today  [ ] Unknown

## 2023-12-13 NOTE — OCCUPATIONAL THERAPY INITIAL EVALUATION ADULT - DIAGNOSIS, OT EVAL
Pt with impaired ROM and strength in R hip and posterior THR precautions impacting ability to complete ADLs, IADLs, functional mobility/transfers.

## 2023-12-13 NOTE — PROGRESS NOTE ADULT - SUBJECTIVE AND OBJECTIVE BOX
POST OPERATIVE DAY #:  2  STATUS POST: Revision Right THR                       SUBJECTIVE: Patient seen and examined.  Up to side of bed with PT and transfered to chair this morning. Tolerating diet. +Voiding. Didn't sleep well due to pain last night.   Reported Pain score =2/10 at rest but 9/10 with any movement or pressure. Patient is taking the narcotic pain regimen regularly. On oxycodone regimen but will change to dilaudid regimen to better control pain.   Denies: CP/SOB/N/V/Numbness/Tingling    OBJECTIVE:     Vital Signs Last 24 Hrs  T(C): 36.9 (13 Dec 2023 07:55), Max: 36.9 (13 Dec 2023 07:55)  T(F): 98.4 (13 Dec 2023 07:55), Max: 98.4 (13 Dec 2023 07:55)  HR: 72 (13 Dec 2023 09:50) (66 - 78)  BP: 123/71 (13 Dec 2023 09:50) (100/55 - 123/71)  BP(mean): 85 (12 Dec 2023 23:30) (85 - 85)  RR: 18 (13 Dec 2023 07:55) (17 - 19)  SpO2: 96% (13 Dec 2023 09:50) (95% - 97%)    Parameters below as of 13 Dec 2023 09:50  Patient On (Oxygen Delivery Method): room air      Gen: AAOx3  Abd: soft, NT, ND  Right hip:          Dressing: clean/dry/intact, no erythema or discharge noted  Bilateral LEs:         Sensation:  intact to light touch          Motor exam:  5/5 dorsiflexion/plantarflexion/EHL          2+ DP pulses          calf supple, NT         Abduction pillow in place         SCDs in place    LABS:                        8.2    7.28  )-----------( 172      ( 13 Dec 2023 06:30 )             25.4     12-13    138  |  105  |  24<H>  ----------------------------<  123<H>  4.0   |  30  |  1.68<H>    Ca    9.0      13 Dec 2023 06:30    TPro  5.5<L>  /  Alb  2.7<L>  /  TBili  0.1<L>  /  DBili  0.0  /  AST  25  /  ALT  26  /  AlkPhos  33  12-13    PT/INR - ( 13 Dec 2023 06:30 )   PT: 11.1 sec;   INR: 1.02 ratio              MEDICATIONS:  Anticoagulation:  apixaban 2.5 milliGRAM(s) Oral every 12 hours      Pain medications:   acetaminophen     Tablet .. 500 milliGRAM(s) Oral every 8 hours  celecoxib 100 milliGRAM(s) Oral every 12 hours  gabapentin 300 milliGRAM(s) Oral three times a day  HYDROmorphone   Tablet 6 milliGRAM(s) Oral every 3 hours PRN  HYDROmorphone   Tablet 4 milliGRAM(s) Oral every 3 hours PRN  HYDROmorphone  Injectable 0.5 milliGRAM(s) IV Push every 3 hours PRN          A/P :68y Male   s/p Revision  Right THR POD # 2  -    Pain control: Acetaminophen, Dilaudid  -    Pain Regimen changed to Dilaudid due no adequate coverage with Oxycodone.   -    Celebrex on hold due to creatinine elevation and CKD, will review and reevaluate  -    Received 2u PRBCs yesterday and 1u PRBC in the PACU- patient is still anemic but remains asymptomatic. BP has been better as well.   -    Cryotherapy with protective skin barrier  -    Incentive Spirometry  -    DVT ppx:  Eliquis 2.5mg q 12 h    and ambulation as tolerated  -    Weight bearing status: Weight-Bearing as Tolerated   -    ID Consult: Dr. Bruno saw patient and  added empiric Ancef for the next 72 hours, may be stopped if cultures remain negative in 72 hours  -    OR cultures; NGTD  -    Medicine Co-management  -    Continue posterior total hip precautions  -    Physical Therapy  -    Occupational Therapy  -    Discharge plan: home vs [facility]  pending PT/OT eval and clearance, medical clearance, and OR culture results with treatment plan.     POST OPERATIVE DAY #:  2  STATUS POST: Revision Right THR                       SUBJECTIVE: Patient seen and examined.  Up to side of bed with PT and transferred to chair this morning. Tolerating diet. +Voiding. Didn't sleep well due to pain last night.   Reported Pain score =2/10 at rest but 9/10 with any movement or pressure. Patient is taking the narcotic pain regimen regularly. On oxycodone regimen but will change to dilaudid regimen to better control pain.   Denies: CP/SOB/N/V/Numbness/Tingling    OBJECTIVE:     Vital Signs Last 24 Hrs  T(C): 36.9 (13 Dec 2023 07:55), Max: 36.9 (13 Dec 2023 07:55)  T(F): 98.4 (13 Dec 2023 07:55), Max: 98.4 (13 Dec 2023 07:55)  HR: 72 (13 Dec 2023 09:50) (66 - 78)  BP: 123/71 (13 Dec 2023 09:50) (100/55 - 123/71)  BP(mean): 85 (12 Dec 2023 23:30) (85 - 85)  RR: 18 (13 Dec 2023 07:55) (17 - 19)  SpO2: 96% (13 Dec 2023 09:50) (95% - 97%)    Parameters below as of 13 Dec 2023 09:50  Patient On (Oxygen Delivery Method): room air      Gen: AAOx3  Abd: soft, NT, ND  Right hip:          Dressing: clean/dry/intact, no erythema or discharge noted  Bilateral LEs:         Sensation:  intact to light touch          Motor exam:  5/5 dorsiflexion/plantarflexion/EHL          2+ DP pulses          calf supple, NT         Abduction pillow in place         SCDs in place    LABS:                        8.2    7.28  )-----------( 172      ( 13 Dec 2023 06:30 )             25.4     12-13    138  |  105  |  24<H>  ----------------------------<  123<H>  4.0   |  30  |  1.68<H>    Ca    9.0      13 Dec 2023 06:30    TPro  5.5<L>  /  Alb  2.7<L>  /  TBili  0.1<L>  /  DBili  0.0  /  AST  25  /  ALT  26  /  AlkPhos  33  12-13    PT/INR - ( 13 Dec 2023 06:30 )   PT: 11.1 sec;   INR: 1.02 ratio              MEDICATIONS:  Anticoagulation:  apixaban 2.5 milliGRAM(s) Oral every 12 hours      Pain medications:   acetaminophen     Tablet .. 500 milliGRAM(s) Oral every 8 hours  celecoxib 100 milliGRAM(s) Oral every 12 hours  gabapentin 300 milliGRAM(s) Oral three times a day  HYDROmorphone   Tablet 6 milliGRAM(s) Oral every 3 hours PRN  HYDROmorphone   Tablet 4 milliGRAM(s) Oral every 3 hours PRN  HYDROmorphone  Injectable 0.5 milliGRAM(s) IV Push every 3 hours PRN          A/P :68y Male   s/p Revision  Right THR POD # 2  -    Pain control: Acetaminophen, Dilaudid  -    Pain Regimen changed to Dilaudid due no adequate coverage with Oxycodone.   -    Celebrex cancelled  due to creatinine elevation and CKD, will review  -    Received 2u PRBCs yesterday and 1u PRBC in the PACU- patient is still anemic but remains asymptomatic. BP has been better as well.   -    Anemia: Asymptomatic and is showing some improvement in numbers. Continue to trend Labs and monitor for symptoms. Will hold on imaging at this time per surgeon. Surgeon aware of plan and agrees.   -    Cryotherapy with protective skin barrier  -    Incentive Spirometry  -    DVT ppx:  Eliquis 2.5mg q 12 h    and ambulation as tolerated  -    Weight bearing status: Weight-Bearing as Tolerated   -    ID Consult: Dr. Bruno saw patient and  added empiric Ancef for the next 72 hours, may be stopped if cultures remain negative in 72 hours  -    OR cultures; NGTD  -    Medicine Co-management  -    Continue posterior total hip precautions  -    Physical Therapy  -    Occupational Therapy  -    Discharge plan: home vs [facility]  pending PT/OT eval and clearance, medical clearance, and OR culture results with treatment plan.   Surgeon aware and agrees with above.

## 2023-12-14 ENCOUNTER — TRANSCRIPTION ENCOUNTER (OUTPATIENT)
Age: 68
End: 2023-12-14

## 2023-12-14 LAB
ANION GAP SERPL CALC-SCNC: 6 MMOL/L — SIGNIFICANT CHANGE UP (ref 5–17)
ANION GAP SERPL CALC-SCNC: 6 MMOL/L — SIGNIFICANT CHANGE UP (ref 5–17)
BUN SERPL-MCNC: 25 MG/DL — HIGH (ref 7–23)
BUN SERPL-MCNC: 25 MG/DL — HIGH (ref 7–23)
CALCIUM SERPL-MCNC: 10.1 MG/DL — SIGNIFICANT CHANGE UP (ref 8.4–10.5)
CALCIUM SERPL-MCNC: 10.1 MG/DL — SIGNIFICANT CHANGE UP (ref 8.4–10.5)
CHLORIDE SERPL-SCNC: 101 MMOL/L — SIGNIFICANT CHANGE UP (ref 96–108)
CHLORIDE SERPL-SCNC: 101 MMOL/L — SIGNIFICANT CHANGE UP (ref 96–108)
CO2 SERPL-SCNC: 31 MMOL/L — SIGNIFICANT CHANGE UP (ref 22–31)
CO2 SERPL-SCNC: 31 MMOL/L — SIGNIFICANT CHANGE UP (ref 22–31)
CREAT SERPL-MCNC: 1.42 MG/DL — HIGH (ref 0.5–1.3)
CREAT SERPL-MCNC: 1.42 MG/DL — HIGH (ref 0.5–1.3)
EGFR: 54 ML/MIN/1.73M2 — LOW
EGFR: 54 ML/MIN/1.73M2 — LOW
GLUCOSE SERPL-MCNC: 111 MG/DL — HIGH (ref 70–99)
GLUCOSE SERPL-MCNC: 111 MG/DL — HIGH (ref 70–99)
HCT VFR BLD CALC: 30.4 % — LOW (ref 39–50)
HCT VFR BLD CALC: 30.4 % — LOW (ref 39–50)
HGB BLD-MCNC: 9.5 G/DL — LOW (ref 13–17)
HGB BLD-MCNC: 9.5 G/DL — LOW (ref 13–17)
MCHC RBC-ENTMCNC: 30 PG — SIGNIFICANT CHANGE UP (ref 27–34)
MCHC RBC-ENTMCNC: 30 PG — SIGNIFICANT CHANGE UP (ref 27–34)
MCHC RBC-ENTMCNC: 31.3 GM/DL — LOW (ref 32–36)
MCHC RBC-ENTMCNC: 31.3 GM/DL — LOW (ref 32–36)
MCV RBC AUTO: 95.9 FL — SIGNIFICANT CHANGE UP (ref 80–100)
MCV RBC AUTO: 95.9 FL — SIGNIFICANT CHANGE UP (ref 80–100)
NRBC # BLD: 0 /100 WBCS — SIGNIFICANT CHANGE UP (ref 0–0)
NRBC # BLD: 0 /100 WBCS — SIGNIFICANT CHANGE UP (ref 0–0)
PLATELET # BLD AUTO: 258 K/UL — SIGNIFICANT CHANGE UP (ref 150–400)
PLATELET # BLD AUTO: 258 K/UL — SIGNIFICANT CHANGE UP (ref 150–400)
POTASSIUM SERPL-MCNC: 4.5 MMOL/L — SIGNIFICANT CHANGE UP (ref 3.5–5.3)
POTASSIUM SERPL-MCNC: 4.5 MMOL/L — SIGNIFICANT CHANGE UP (ref 3.5–5.3)
POTASSIUM SERPL-SCNC: 4.5 MMOL/L — SIGNIFICANT CHANGE UP (ref 3.5–5.3)
POTASSIUM SERPL-SCNC: 4.5 MMOL/L — SIGNIFICANT CHANGE UP (ref 3.5–5.3)
RBC # BLD: 3.17 M/UL — LOW (ref 4.2–5.8)
RBC # BLD: 3.17 M/UL — LOW (ref 4.2–5.8)
RBC # FLD: 14.8 % — HIGH (ref 10.3–14.5)
RBC # FLD: 14.8 % — HIGH (ref 10.3–14.5)
SODIUM SERPL-SCNC: 138 MMOL/L — SIGNIFICANT CHANGE UP (ref 135–145)
SODIUM SERPL-SCNC: 138 MMOL/L — SIGNIFICANT CHANGE UP (ref 135–145)
SURGICAL PATHOLOGY STUDY: SIGNIFICANT CHANGE UP
SURGICAL PATHOLOGY STUDY: SIGNIFICANT CHANGE UP
WBC # BLD: 8.3 K/UL — SIGNIFICANT CHANGE UP (ref 3.8–10.5)
WBC # BLD: 8.3 K/UL — SIGNIFICANT CHANGE UP (ref 3.8–10.5)
WBC # FLD AUTO: 8.3 K/UL — SIGNIFICANT CHANGE UP (ref 3.8–10.5)
WBC # FLD AUTO: 8.3 K/UL — SIGNIFICANT CHANGE UP (ref 3.8–10.5)

## 2023-12-14 PROCEDURE — 99232 SBSQ HOSP IP/OBS MODERATE 35: CPT

## 2023-12-14 RX ADMIN — HYDROMORPHONE HYDROCHLORIDE 6 MILLIGRAM(S): 2 INJECTION INTRAMUSCULAR; INTRAVENOUS; SUBCUTANEOUS at 01:05

## 2023-12-14 RX ADMIN — APIXABAN 2.5 MILLIGRAM(S): 2.5 TABLET, FILM COATED ORAL at 21:58

## 2023-12-14 RX ADMIN — Medication 500 MILLIGRAM(S): at 22:25

## 2023-12-14 RX ADMIN — Medication 500 MILLIGRAM(S): at 06:27

## 2023-12-14 RX ADMIN — POLYETHYLENE GLYCOL 3350 17 GRAM(S): 17 POWDER, FOR SOLUTION ORAL at 21:58

## 2023-12-14 RX ADMIN — HYDROMORPHONE HYDROCHLORIDE 6 MILLIGRAM(S): 2 INJECTION INTRAMUSCULAR; INTRAVENOUS; SUBCUTANEOUS at 22:07

## 2023-12-14 RX ADMIN — Medication 500 MILLIGRAM(S): at 22:39

## 2023-12-14 RX ADMIN — GABAPENTIN 300 MILLIGRAM(S): 400 CAPSULE ORAL at 06:29

## 2023-12-14 RX ADMIN — Medication 145 MILLIGRAM(S): at 21:58

## 2023-12-14 RX ADMIN — HYDROMORPHONE HYDROCHLORIDE 6 MILLIGRAM(S): 2 INJECTION INTRAMUSCULAR; INTRAVENOUS; SUBCUTANEOUS at 12:45

## 2023-12-14 RX ADMIN — APIXABAN 2.5 MILLIGRAM(S): 2.5 TABLET, FILM COATED ORAL at 08:43

## 2023-12-14 RX ADMIN — PANTOPRAZOLE SODIUM 40 MILLIGRAM(S): 20 TABLET, DELAYED RELEASE ORAL at 06:28

## 2023-12-14 RX ADMIN — HYDROMORPHONE HYDROCHLORIDE 6 MILLIGRAM(S): 2 INJECTION INTRAMUSCULAR; INTRAVENOUS; SUBCUTANEOUS at 09:30

## 2023-12-14 RX ADMIN — SENNA PLUS 2 TABLET(S): 8.6 TABLET ORAL at 21:58

## 2023-12-14 RX ADMIN — Medication 100 MILLIGRAM(S): at 06:27

## 2023-12-14 RX ADMIN — HYDROMORPHONE HYDROCHLORIDE 6 MILLIGRAM(S): 2 INJECTION INTRAMUSCULAR; INTRAVENOUS; SUBCUTANEOUS at 16:46

## 2023-12-14 RX ADMIN — Medication 500 MILLIGRAM(S): at 15:59

## 2023-12-14 RX ADMIN — GABAPENTIN 300 MILLIGRAM(S): 400 CAPSULE ORAL at 22:06

## 2023-12-14 RX ADMIN — Medication 500 MILLIGRAM(S): at 16:30

## 2023-12-14 RX ADMIN — HYDROMORPHONE HYDROCHLORIDE 6 MILLIGRAM(S): 2 INJECTION INTRAMUSCULAR; INTRAVENOUS; SUBCUTANEOUS at 16:00

## 2023-12-14 RX ADMIN — HYDROMORPHONE HYDROCHLORIDE 6 MILLIGRAM(S): 2 INJECTION INTRAMUSCULAR; INTRAVENOUS; SUBCUTANEOUS at 08:42

## 2023-12-14 RX ADMIN — HYDROMORPHONE HYDROCHLORIDE 6 MILLIGRAM(S): 2 INJECTION INTRAMUSCULAR; INTRAVENOUS; SUBCUTANEOUS at 19:02

## 2023-12-14 RX ADMIN — HYDROMORPHONE HYDROCHLORIDE 6 MILLIGRAM(S): 2 INJECTION INTRAMUSCULAR; INTRAVENOUS; SUBCUTANEOUS at 23:07

## 2023-12-14 RX ADMIN — HYDROMORPHONE HYDROCHLORIDE 6 MILLIGRAM(S): 2 INJECTION INTRAMUSCULAR; INTRAVENOUS; SUBCUTANEOUS at 20:02

## 2023-12-14 RX ADMIN — Medication 500 MILLIGRAM(S): at 06:31

## 2023-12-14 RX ADMIN — HYDROMORPHONE HYDROCHLORIDE 6 MILLIGRAM(S): 2 INJECTION INTRAMUSCULAR; INTRAVENOUS; SUBCUTANEOUS at 01:53

## 2023-12-14 RX ADMIN — Medication 100 MILLIGRAM(S): at 15:59

## 2023-12-14 RX ADMIN — HYDROMORPHONE HYDROCHLORIDE 6 MILLIGRAM(S): 2 INJECTION INTRAMUSCULAR; INTRAVENOUS; SUBCUTANEOUS at 12:09

## 2023-12-14 RX ADMIN — GABAPENTIN 300 MILLIGRAM(S): 400 CAPSULE ORAL at 16:00

## 2023-12-14 NOTE — PROGRESS NOTE ADULT - SUBJECTIVE AND OBJECTIVE BOX
Patient is a 68y old  Male who presents with a chief complaint of left hip arthroplasty revision (12 Dec 2023 12:36)      INTERVAL HPI/OVERNIGHT EVENTS:        MEDICATIONS  (STANDING):  acetaminophen     Tablet .. 500 milliGRAM(s) Oral every 8 hours  apixaban 2.5 milliGRAM(s) Oral every 12 hours  ceFAZolin   IVPB      ceFAZolin   IVPB 2000 milliGRAM(s) IV Intermittent every 8 hours  fenofibrate Tablet 145 milliGRAM(s) Oral at bedtime  gabapentin 300 milliGRAM(s) Oral three times a day  lactated ringers. 1000 milliLiter(s) (100 mL/Hr) IV Continuous <Continuous>  pantoprazole    Tablet 40 milliGRAM(s) Oral before breakfast  polyethylene glycol 3350 17 Gram(s) Oral at bedtime  senna 2 Tablet(s) Oral at bedtime  valsartan 160 milliGRAM(s) Oral daily    MEDICATIONS  (PRN):  HYDROmorphone   Tablet 6 milliGRAM(s) Oral every 3 hours PRN Severe Pain (7 - 10)  HYDROmorphone   Tablet 4 milliGRAM(s) Oral every 3 hours PRN Moderate Pain (4 - 6)  HYDROmorphone  Injectable 0.5 milliGRAM(s) IV Push every 3 hours PRN Breakthrough pain  magnesium hydroxide Suspension 30 milliLiter(s) Oral daily PRN Constipation  ondansetron Injectable 4 milliGRAM(s) IV Push every 6 hours PRN Nausea and/or Vomiting  zolpidem 5 milliGRAM(s) Oral at bedtime PRN Insomnia  zolpidem 5 milliGRAM(s) Oral at bedtime PRN Insomnia      Allergies    No Known Allergies    Intolerances        REVIEW OF SYSTEMS:  CONSTITUTIONAL: No fever, weight loss, or fatigue  EYES: No eye pain, visual disturbances, or discharge  ENMT:  No difficulty hearing, tinnitus, vertigo; No sinus or throat pain  NECK: No pain or stiffness  BREASTS: No pain, masses, or nipple discharge  RESPIRATORY: No cough, wheezing, chills or hemoptysis; No shortness of breath  CARDIOVASCULAR: No chest pain, palpitations, lightheadedness, or leg swelling  GASTROINTESTINAL: No abdominal or epigastric pain. No nausea, vomiting, or hematemesis; No diarrhea or constipation. No melena or hematochezia.  GENITOURINARY: No dysuria, frequency, hematuria, or incontinence  NEUROLOGICAL: No headaches, memory loss, vertigo, loss of strength, numbness, or tremors  SKIN: No itching, burning, rashes, or lesions   LYMPH NODES: No enlarged glands  ENDOCRINE: No heat or cold intolerance; No hair loss; No polydipsia or polyuria  MUSCULOSKELETAL: No joint pain or swelling; No muscle, back, or extremity pain  PSYCHIATRIC: No depression, anxiety, or mood swings  HEME/LYMPH: No easy bruising, or bleeding gums  ALLERGY AND IMMUNOLOGIC: No hives or eczema    Vital Signs Last 24 Hrs  T(C): 36.9 (14 Dec 2023 07:35), Max: 36.9 (14 Dec 2023 04:30)  T(F): 98.4 (14 Dec 2023 07:35), Max: 98.4 (14 Dec 2023 04:30)  HR: 62 (14 Dec 2023 07:35) (62 - 74)  BP: 106/71 (14 Dec 2023 07:35) (97/59 - 116/69)  BP(mean): --  RR: 18 (14 Dec 2023 07:35) (18 - 18)  SpO2: 62% (14 Dec 2023 07:35) (62% - 99%)    Parameters below as of 14 Dec 2023 04:30  Patient On (Oxygen Delivery Method): room air        PHYSICAL EXAM:  GENERAL: NAD, well-groomed, well-developed  HEAD:  Atraumatic, Normocephalic  EYES: EOMI, PERRLA, conjunctiva and sclera clear  ENMT: Moist mucous membranes, No lesions; No tonsillar erythema, exudates, or enlargement  NECK: Supple, No JVD, Normal thyroid  NERVOUS SYSTEM:  Alert & Oriented X3, Good concentration; All 4 extremities mobile, no gross sensory deficits.   CHEST/LUNG: Clear to auscultation bilaterally; No rales, rhonchi, wheezing, or rubs  HEART: Regular rate and rhythm; No murmurs, rubs, or gallops  ABDOMEN: Soft, Nontender, Nondistended; Bowel sounds present  EXTREMITIES:  2+ Peripheral Pulses, No clubbing, cyanosis, or edema  LYMPH: No lymphadenopathy noted  SKIN: No rashes or lesions    LABS:      Ca    9.0        13 Dec 2023 06:30      PT/INR - ( 13 Dec 2023 06:30 )   PT: 11.1 sec;   INR: 1.02 ratio         PTT - ( 13 Dec 2023 06:30 )  PTT:28.1 sec  Urinalysis Basic - ( 13 Dec 2023 06:30 )    Color: x / Appearance: x / SG: x / pH: x  Gluc: 123 mg/dL / Ketone: x  / Bili: x / Urobili: x   Blood: x / Protein: x / Nitrite: x   Leuk Esterase: x / RBC: x / WBC x   Sq Epi: x / Non Sq Epi: x / Bacteria: x      CAPILLARY BLOOD GLUCOSE          RADIOLOGY & ADDITIONAL TESTS:

## 2023-12-14 NOTE — PROGRESS NOTE ADULT - SUBJECTIVE AND OBJECTIVE BOX
Post Op Day #3    SUBJECTIVE    67yo Male status post revision of Right THR .   Patient is alert and comfortable.    Pain is controlled with current pain regimen.  Denies nausea, vomiting, chest pain, shortness of breath, abdominal pain or fever.   No new complaints.    OBJECTIVE    Vital Signs Last 24 Hrs  T(C): 36.9 (14 Dec 2023 07:35), Max: 36.9 (14 Dec 2023 04:30)  T(F): 98.4 (14 Dec 2023 07:35), Max: 98.4 (14 Dec 2023 04:30)  HR: 62 (14 Dec 2023 07:35) (62 - 74)  BP: 106/71 (14 Dec 2023 07:35) (97/59 - 116/69)  BP(mean): --  RR: 18 (14 Dec 2023 07:35) (18 - 18)  SpO2: 62% (14 Dec 2023 07:35) (62% - 99%)    Parameters below as of 14 Dec 2023 04:30  Patient On (Oxygen Delivery Method): room air      I&O's Summary    13 Dec 2023 07:01  -  14 Dec 2023 07:00  --------------------------------------------------------  IN: 0 mL / OUT: 700 mL / NET: -700 mL        PHYSICAL EXAM    Right Hip MORIS dressing is clean, dry and intact.   The calf is supple/nontender.   Sensation to light touch is grossly intact distally.   Motor function distally is intact.   No foot drop.   (2+) dorsalis pedis pulse. Capillary refill is less than 2 seconds. No cyanosis.                          8.2<L>  7.28  )-----------( 172      ( 13 Dec 2023 06:30 )             25.4<L>  13 Dec 2023 06:30    13 Dec 2023 06:30    138    |  105    |  24<H>  ----------------------------<  123<H>  4.0     |  30     |  1.68<H>    Ca    9.0        13 Dec 2023 06:30    TPro  5.5<L>  /  Alb  2.7<L>  /  TBili  0.1<L>  /  DBili  0.0    /  AST  25     /  ALT  26     /  AlkPhos  33     13 Dec 2023 06:30      Culture - Joint (collected 11 Dec 2023 14:01)  Source: Hip RIGHT HIP JOINT 5  Gram Stain (12 Dec 2023 00:52):    No polymorphonuclear leukocytes seen per low power field    No organisms seen per oil power field  Preliminary Report (12 Dec 2023 18:41):    No growth to date.    Culture - Joint (collected 11 Dec 2023 14:01)  Source: Hip RIGHT HIP JOINT 4  Gram Stain (12 Dec 2023 00:51):    No polymorphonuclear leukocytes seen per low power field    No organisms seen per oil power field  Preliminary Report (12 Dec 2023 18:40):    No growth to date.    Culture - Joint (collected 11 Dec 2023 14:01)  Source: Hip RIGHT HIP JOINT 3  Gram Stain (12 Dec 2023 00:53):    No polymorphonuclear leukocytes seen per low power field    No organisms seen per oil power field  Preliminary Report (12 Dec 2023 18:37):    No growth to date.    Culture - Joint (collected 11 Dec 2023 14:01)  Source: Hip RIGHT HIP JOINT 2  Gram Stain (12 Dec 2023 00:52):    No polymorphonuclear leukocytes seen per low power field    No organisms seen per oil power field  Preliminary Report (12 Dec 2023 18:34):    No growth to date.    Culture - Joint (collected 11 Dec 2023 14:01)  Source: Hip RIGHT HIP JOINT 1  Gram Stain (12 Dec 2023 00:52):    No polymorphonuclear leukocytes seen per low power field    No organisms seen per oil power field  Preliminary Report (12 Dec 2023 18:42):    No growth to date.    Culture - Joint (collected 11 Dec 2023 14:01)  Source: Joint Fl Joint Fluid  Gram Stain (12 Dec 2023 00:54):    No polymorphonuclear leukocytes seen per low power field    No organisms seen per oil power field  Preliminary Report (12 Dec 2023 18:57):    No growth to date.        ASSESSMENT AND PLAN    - Orthopedically stable  - OR cultures negative to date  - Continue ancef for now  - ID follow up   - DVT prophylaxis: PAS + Eliquis 2.5mg every 12 hours  - Continue physical therapy and occupational therapy  - Weight bearing as tolerated of the right lower extremity with assistance of a walker  - Incentive spirometry encouraged  - Pain control as clinically indicated  - Disposition: Home vs subacute rehabilitation pending hospital course         Post Op Day #3    SUBJECTIVE    69yo Male status post revision of Right THR .   Patient is alert and comfortable.    Pain is controlled with current pain regimen.  Denies nausea, vomiting, chest pain, shortness of breath, abdominal pain or fever.   No new complaints.    OBJECTIVE    Vital Signs Last 24 Hrs  T(C): 36.9 (14 Dec 2023 07:35), Max: 36.9 (14 Dec 2023 04:30)  T(F): 98.4 (14 Dec 2023 07:35), Max: 98.4 (14 Dec 2023 04:30)  HR: 62 (14 Dec 2023 07:35) (62 - 74)  BP: 106/71 (14 Dec 2023 07:35) (97/59 - 116/69)  BP(mean): --  RR: 18 (14 Dec 2023 07:35) (18 - 18)  SpO2: 62% (14 Dec 2023 07:35) (62% - 99%)    Parameters below as of 14 Dec 2023 04:30  Patient On (Oxygen Delivery Method): room air      I&O's Summary    13 Dec 2023 07:01  -  14 Dec 2023 07:00  --------------------------------------------------------  IN: 0 mL / OUT: 700 mL / NET: -700 mL        PHYSICAL EXAM    Right Hip MORIS dressing is clean, dry and intact.   The calf is supple/nontender.   Sensation to light touch is grossly intact distally.   Motor function distally is intact.   No foot drop.   (2+) dorsalis pedis pulse. Capillary refill is less than 2 seconds. No cyanosis.                          8.2<L>  7.28  )-----------( 172      ( 13 Dec 2023 06:30 )             25.4<L>  13 Dec 2023 06:30    13 Dec 2023 06:30    138    |  105    |  24<H>  ----------------------------<  123<H>  4.0     |  30     |  1.68<H>    Ca    9.0        13 Dec 2023 06:30    TPro  5.5<L>  /  Alb  2.7<L>  /  TBili  0.1<L>  /  DBili  0.0    /  AST  25     /  ALT  26     /  AlkPhos  33     13 Dec 2023 06:30      Culture - Joint (collected 11 Dec 2023 14:01)  Source: Hip RIGHT HIP JOINT 5  Gram Stain (12 Dec 2023 00:52):    No polymorphonuclear leukocytes seen per low power field    No organisms seen per oil power field  Preliminary Report (12 Dec 2023 18:41):    No growth to date.    Culture - Joint (collected 11 Dec 2023 14:01)  Source: Hip RIGHT HIP JOINT 4  Gram Stain (12 Dec 2023 00:51):    No polymorphonuclear leukocytes seen per low power field    No organisms seen per oil power field  Preliminary Report (12 Dec 2023 18:40):    No growth to date.    Culture - Joint (collected 11 Dec 2023 14:01)  Source: Hip RIGHT HIP JOINT 3  Gram Stain (12 Dec 2023 00:53):    No polymorphonuclear leukocytes seen per low power field    No organisms seen per oil power field  Preliminary Report (12 Dec 2023 18:37):    No growth to date.    Culture - Joint (collected 11 Dec 2023 14:01)  Source: Hip RIGHT HIP JOINT 2  Gram Stain (12 Dec 2023 00:52):    No polymorphonuclear leukocytes seen per low power field    No organisms seen per oil power field  Preliminary Report (12 Dec 2023 18:34):    No growth to date.    Culture - Joint (collected 11 Dec 2023 14:01)  Source: Hip RIGHT HIP JOINT 1  Gram Stain (12 Dec 2023 00:52):    No polymorphonuclear leukocytes seen per low power field    No organisms seen per oil power field  Preliminary Report (12 Dec 2023 18:42):    No growth to date.    Culture - Joint (collected 11 Dec 2023 14:01)  Source: Joint Fl Joint Fluid  Gram Stain (12 Dec 2023 00:54):    No polymorphonuclear leukocytes seen per low power field    No organisms seen per oil power field  Preliminary Report (12 Dec 2023 18:57):    No growth to date.        ASSESSMENT AND PLAN    - Orthopedically stable  - OR cultures negative to date  - Continue ancef for now  - ID follow up   - DVT prophylaxis: PAS + Eliquis 2.5mg every 12 hours  - Continue physical therapy and occupational therapy  - Weight bearing as tolerated of the right lower extremity with assistance of a walker  - Incentive spirometry encouraged  - Pain control as clinically indicated  - Disposition: Home vs subacute rehabilitation pending hospital course         Post Op Day #3    SUBJECTIVE    67yo Male status post revision of Right THR .   Patient is alert and comfortable.    Pain is controlled with current pain regimen.  Denies nausea, vomiting, chest pain, shortness of breath, abdominal pain or fever.   No new complaints.    OBJECTIVE    Vital Signs Last 24 Hrs  T(C): 36.9 (14 Dec 2023 07:35), Max: 36.9 (14 Dec 2023 04:30)  T(F): 98.4 (14 Dec 2023 07:35), Max: 98.4 (14 Dec 2023 04:30)  HR: 62 (14 Dec 2023 07:35) (62 - 74)  BP: 106/71 (14 Dec 2023 07:35) (97/59 - 116/69)  BP(mean): --  RR: 18 (14 Dec 2023 07:35) (18 - 18)  SpO2: 62% (14 Dec 2023 07:35) (62% - 99%)    Parameters below as of 14 Dec 2023 04:30  Patient On (Oxygen Delivery Method): room air      I&O's Summary    13 Dec 2023 07:01  -  14 Dec 2023 07:00  --------------------------------------------------------  IN: 0 mL / OUT: 700 mL / NET: -700 mL        PHYSICAL EXAM    Right Hip MORIS dressing is clean, dry and intact.   The calf is supple/nontender.   Sensation to light touch is grossly intact distally.   Motor function distally is intact.   No foot drop.   (2+) dorsalis pedis pulse. Capillary refill is less than 2 seconds. No cyanosis.                          8.2<L>  7.28  )-----------( 172      ( 13 Dec 2023 06:30 )             25.4<L>  13 Dec 2023 06:30    13 Dec 2023 06:30    138    |  105    |  24<H>  ----------------------------<  123<H>  4.0     |  30     |  1.68<H>    Ca    9.0        13 Dec 2023 06:30    TPro  5.5<L>  /  Alb  2.7<L>  /  TBili  0.1<L>  /  DBili  0.0    /  AST  25     /  ALT  26     /  AlkPhos  33     13 Dec 2023 06:30      Culture - Joint (collected 11 Dec 2023 14:01)  Source: Hip RIGHT HIP JOINT 5  Gram Stain (12 Dec 2023 00:52):    No polymorphonuclear leukocytes seen per low power field    No organisms seen per oil power field  Preliminary Report (12 Dec 2023 18:41):    No growth to date.    Culture - Joint (collected 11 Dec 2023 14:01)  Source: Hip RIGHT HIP JOINT 4  Gram Stain (12 Dec 2023 00:51):    No polymorphonuclear leukocytes seen per low power field    No organisms seen per oil power field  Preliminary Report (12 Dec 2023 18:40):    No growth to date.    Culture - Joint (collected 11 Dec 2023 14:01)  Source: Hip RIGHT HIP JOINT 3  Gram Stain (12 Dec 2023 00:53):    No polymorphonuclear leukocytes seen per low power field    No organisms seen per oil power field  Preliminary Report (12 Dec 2023 18:37):    No growth to date.    Culture - Joint (collected 11 Dec 2023 14:01)  Source: Hip RIGHT HIP JOINT 2  Gram Stain (12 Dec 2023 00:52):    No polymorphonuclear leukocytes seen per low power field    No organisms seen per oil power field  Preliminary Report (12 Dec 2023 18:34):    No growth to date.    Culture - Joint (collected 11 Dec 2023 14:01)  Source: Hip RIGHT HIP JOINT 1  Gram Stain (12 Dec 2023 00:52):    No polymorphonuclear leukocytes seen per low power field    No organisms seen per oil power field  Preliminary Report (12 Dec 2023 18:42):    No growth to date.    Culture - Joint (collected 11 Dec 2023 14:01)  Source: Joint Fl Joint Fluid  Gram Stain (12 Dec 2023 00:54):    No polymorphonuclear leukocytes seen per low power field    No organisms seen per oil power field  Preliminary Report (12 Dec 2023 18:57):    No growth to date.        ASSESSMENT AND PLAN    - Orthopedically stable  - OR cultures negative to date. Continue to monitor  - Continue ancef for now  - ID follow up   - Repeat CBC, BMP  - DVT prophylaxis: PAS + Eliquis 2.5mg every 12 hours  - Continue physical therapy and occupational therapy  - Weight bearing as tolerated of the right lower extremity with assistance of a walker  - Incentive spirometry encouraged  - Pain control as clinically indicated  - Disposition: Home vs subacute rehabilitation pending hospital course         Post Op Day #3    SUBJECTIVE    69yo Male status post revision of Right THR .   Patient is alert and comfortable.    Pain is controlled with current pain regimen.  Denies nausea, vomiting, chest pain, shortness of breath, abdominal pain or fever.   No new complaints.    OBJECTIVE    Vital Signs Last 24 Hrs  T(C): 36.9 (14 Dec 2023 07:35), Max: 36.9 (14 Dec 2023 04:30)  T(F): 98.4 (14 Dec 2023 07:35), Max: 98.4 (14 Dec 2023 04:30)  HR: 62 (14 Dec 2023 07:35) (62 - 74)  BP: 106/71 (14 Dec 2023 07:35) (97/59 - 116/69)  BP(mean): --  RR: 18 (14 Dec 2023 07:35) (18 - 18)  SpO2: 62% (14 Dec 2023 07:35) (62% - 99%)    Parameters below as of 14 Dec 2023 04:30  Patient On (Oxygen Delivery Method): room air      I&O's Summary    13 Dec 2023 07:01  -  14 Dec 2023 07:00  --------------------------------------------------------  IN: 0 mL / OUT: 700 mL / NET: -700 mL        PHYSICAL EXAM    Right Hip MORIS dressing is clean, dry and intact.   The calf is supple/nontender.   Sensation to light touch is grossly intact distally.   Motor function distally is intact.   No foot drop.   (2+) dorsalis pedis pulse. Capillary refill is less than 2 seconds. No cyanosis.                          8.2<L>  7.28  )-----------( 172      ( 13 Dec 2023 06:30 )             25.4<L>  13 Dec 2023 06:30    13 Dec 2023 06:30    138    |  105    |  24<H>  ----------------------------<  123<H>  4.0     |  30     |  1.68<H>    Ca    9.0        13 Dec 2023 06:30    TPro  5.5<L>  /  Alb  2.7<L>  /  TBili  0.1<L>  /  DBili  0.0    /  AST  25     /  ALT  26     /  AlkPhos  33     13 Dec 2023 06:30      Culture - Joint (collected 11 Dec 2023 14:01)  Source: Hip RIGHT HIP JOINT 5  Gram Stain (12 Dec 2023 00:52):    No polymorphonuclear leukocytes seen per low power field    No organisms seen per oil power field  Preliminary Report (12 Dec 2023 18:41):    No growth to date.    Culture - Joint (collected 11 Dec 2023 14:01)  Source: Hip RIGHT HIP JOINT 4  Gram Stain (12 Dec 2023 00:51):    No polymorphonuclear leukocytes seen per low power field    No organisms seen per oil power field  Preliminary Report (12 Dec 2023 18:40):    No growth to date.    Culture - Joint (collected 11 Dec 2023 14:01)  Source: Hip RIGHT HIP JOINT 3  Gram Stain (12 Dec 2023 00:53):    No polymorphonuclear leukocytes seen per low power field    No organisms seen per oil power field  Preliminary Report (12 Dec 2023 18:37):    No growth to date.    Culture - Joint (collected 11 Dec 2023 14:01)  Source: Hip RIGHT HIP JOINT 2  Gram Stain (12 Dec 2023 00:52):    No polymorphonuclear leukocytes seen per low power field    No organisms seen per oil power field  Preliminary Report (12 Dec 2023 18:34):    No growth to date.    Culture - Joint (collected 11 Dec 2023 14:01)  Source: Hip RIGHT HIP JOINT 1  Gram Stain (12 Dec 2023 00:52):    No polymorphonuclear leukocytes seen per low power field    No organisms seen per oil power field  Preliminary Report (12 Dec 2023 18:42):    No growth to date.    Culture - Joint (collected 11 Dec 2023 14:01)  Source: Joint Fl Joint Fluid  Gram Stain (12 Dec 2023 00:54):    No polymorphonuclear leukocytes seen per low power field    No organisms seen per oil power field  Preliminary Report (12 Dec 2023 18:57):    No growth to date.        ASSESSMENT AND PLAN    - Orthopedically stable  - OR cultures negative to date. Continue to monitor  - Continue ancef for now  - ID follow up   - Repeat CBC, BMP  - DVT prophylaxis: PAS + Eliquis 2.5mg every 12 hours  - Continue physical therapy and occupational therapy  - Weight bearing as tolerated of the right lower extremity with assistance of a walker  - Incentive spirometry encouraged  - Pain control as clinically indicated  - Disposition: Home vs subacute rehabilitation pending hospital course

## 2023-12-14 NOTE — DISCHARGE NOTE NURSING/CASE MANAGEMENT/SOCIAL WORK - NSDCDMENAME_GEN_ALL_CORE_FT
[facility] [phone] rolling walker, commode and hip kit  [facility] [phone] rolling walker, commode and hip kit

## 2023-12-14 NOTE — PROGRESS NOTE ADULT - ASSESSMENT
67 yo male, pmh of htn, hld, OA, CKD with failed right hip arthroplasty, presenting for revision  - pain control, dvt ppx as per ortho  - s/p 2 units prbc, hgb increased to 8.2, BP now normotensive  - hold losartan/hctz  - creatinine at 1.6 no change, appears to be patient's baseline  - no medical contraindication to DC  PER ID if no growth 72hrs of culture, then may dc abx

## 2023-12-14 NOTE — DISCHARGE NOTE NURSING/CASE MANAGEMENT/SOCIAL WORK - PATIENT PORTAL LINK FT
You can access the FollowMyHealth Patient Portal offered by [facility] by registering at the following website: [email or web address]. By joining [facility]’s FollowMyHealth portal, you will also be able to view your health information using other applications (apps) compatible with our system. You can access the FollowMyHealth Patient Portal offered by  by registering at the following website: [email or web address]. By joining [facility]’s FollowMyHealth portal, you will also be able to view your health information using other applications (apps) compatible with our system.

## 2023-12-14 NOTE — DISCHARGE NOTE NURSING/CASE MANAGEMENT/SOCIAL WORK - NSDCPEFALRISK_GEN_ALL_CORE
For information on Fall & Injury Prevention, visit: [email or web address] OR  [email or web address] OR  https://www.cdc.gov/steadi/patient.html For information on Fall & Injury Prevention, visit: [email or web address] OR  [email or web address] OR  https://www.cdc.gov/steadi/patient.html

## 2023-12-14 NOTE — SOCIAL WORK PROGRESS NOTE - NSSWPROGRESSNOTE_GEN_ALL_CORE
Per interdisicplinary team patient should be able to transition home with home care. Ortho PA reported that we are waiting for cultures and we are still waiting for approval for KI at [facility]. SW will follow and remain available for possible transition to rehab or home with home care  Per interdisicplinary team patient should be able to transition home with home care. Ortho PA reported that we are waiting for cultures and we are still waiting for approval for KI at [facility]. SW will follow and remain available for possible transition to rehab or home with home care

## 2023-12-14 NOTE — DISCHARGE NOTE NURSING/CASE MANAGEMENT/SOCIAL WORK - NSSCNAMETXT_GEN_ALL_CORE
[facility] care agency [phone] will reach out to you within 24-72 hours of your discharge to schedule home care visit/eval appointment with you. Please call agency for any queries regarding home care services  [facility] care agency [phone] will reach out to you within 24-72 hours of your discharge to schedule home care visit/eval appointment with you. Please call agency for any queries regarding home care services

## 2023-12-14 NOTE — PROGRESS NOTE ADULT - SUBJECTIVE AND OBJECTIVE BOX
[facility] , patient examined and chart reviewed.       INTERVAL HPI/ OVERNIGHT EVENTS:   Afebrile. Feeling better.    PAST MEDICAL & SURGICAL HISTORY:  Osteoarthritis of right hip  Painful orthopaedic hardware  Hypertension  Hyperlipidemia  Syncopal episodes  Status post total replacement of right hip  S/P appendectomy  S/P hernia repair      For details regarding the patient's social history, family history, and other miscellaneous elements, please refer the initial infectious diseases consultation and/or the admitting history and physical examination for this admission.    ROS:  CONSTITUTIONAL:  Negative fever or chills  EYES:  Negative  blurry vision or double vision  CARDIOVASCULAR:  Negative for chest pain or palpitations  RESPIRATORY:  Negative for cough, wheezing, or SOB   GASTROINTESTINAL:  Negative for nausea, vomiting, diarrhea, constipation, or abdominal pain  GENITOURINARY:  Negative frequency, urgency or dysuria  NEUROLOGIC:  No headache, confusion, dizziness, lightheadedness  All other systems were reviewed and are negative     No Known Allergies      Current inpatient medications :    ANTIBIOTICS/RELEVANT:  ceFAZolin   IVPB      ceFAZolin   IVPB 2000 milliGRAM(s) IV Intermittent every 8 hours      MEDICATIONS  (STANDING):  acetaminophen     Tablet .. 500 milliGRAM(s) Oral every 8 hours  apixaban 2.5 milliGRAM(s) Oral every 12 hours  fenofibrate Tablet 145 milliGRAM(s) Oral at bedtime  gabapentin 300 milliGRAM(s) Oral three times a day  lactated ringers. 1000 milliLiter(s) (100 mL/Hr) IV Continuous <Continuous>  pantoprazole    Tablet 40 milliGRAM(s) Oral before breakfast  polyethylene glycol 3350 17 Gram(s) Oral at bedtime  senna 2 Tablet(s) Oral at bedtime  valsartan 160 milliGRAM(s) Oral daily    MEDICATIONS  (PRN):  HYDROmorphone   Tablet 6 milliGRAM(s) Oral every 3 hours PRN Severe Pain (7 - 10)  HYDROmorphone   Tablet 4 milliGRAM(s) Oral every 3 hours PRN Moderate Pain (4 - 6)  HYDROmorphone  Injectable 0.5 milliGRAM(s) IV Push every 3 hours PRN Breakthrough pain  magnesium hydroxide Suspension 30 milliLiter(s) Oral daily PRN Constipation  ondansetron Injectable 4 milliGRAM(s) IV Push every 6 hours PRN Nausea and/or Vomiting  zolpidem 5 milliGRAM(s) Oral at bedtime PRN Insomnia  zolpidem 5 milliGRAM(s) Oral at bedtime PRN Insomnia      Objective:  Vital Signs Last 24 Hrs  T(C): 36.9 (14 Dec 2023 07:35), Max: 36.9 (14 Dec 2023 04:30)  T(F): 98.4 (14 Dec 2023 07:35), Max: 98.4 (14 Dec 2023 04:30)  HR: 62 (14 Dec 2023 07:35) (62 - 74)  BP: 106/71 (14 Dec 2023 07:35) (97/59 - 116/69)  RR: 18 (14 Dec 2023 07:35) (18 - 18)  SpO2: 62% (14 Dec 2023 07:35) (62% - 99%)    Parameters below as of 14 Dec 2023 04:30  Patient On (Oxygen Delivery Method): room air        Physical Exam:  General: well developed well nourished, in no acute distress  Neck: supple, trachea midline  Lungs: clear, no wheeze/rhonchi  Cardiovascular: regular rate and rhythm, S1 S2  Abdomen: soft, nontender,  bowel sounds normal  Neurological: alert and oriented x3  Skin: no rash  Extremities: Right hip surgical site c/d/i          LABS:                        8.2    7.28  )-----------( 172      ( 13 Dec 2023 06:30 )             25.4   12-13    138  |  105  |  24<H>  ----------------------------<  123<H>  4.0   |  30  |  1.68<H>    Ca    9.0      13 Dec 2023 06:30    TPro  5.5<L>  /  Alb  2.7<L>  /  TBili  0.1<L>  /  DBili  0.0  /  AST  25  /  ALT  26  /  AlkPhos  33  12-13    MICROBIOLOGY:    Culture - Joint (collected 11 Dec 2023 14:01)  Source: Hip RIGHT HIP JOINT 5  Gram Stain (12 Dec 2023 00:52):    No polymorphonuclear leukocytes seen per low power field    No organisms seen per oil power field  Preliminary Report (12 Dec 2023 18:41):    No growth to date.    Culture - Joint (collected 11 Dec 2023 14:01)  Source: Hip RIGHT HIP JOINT 4  Gram Stain (12 Dec 2023 00:51):    No polymorphonuclear leukocytes seen per low power field    No organisms seen per oil power field  Preliminary Report (12 Dec 2023 18:40):    No growth to date.    Culture - Joint (collected 11 Dec 2023 14:01)  Source: Hip RIGHT HIP JOINT 3  Gram Stain (12 Dec 2023 00:53):    No polymorphonuclear leukocytes seen per low power field    No organisms seen per oil power field  Preliminary Report (12 Dec 2023 18:37):    No growth to date.    Culture - Joint (collected 11 Dec 2023 14:01)  Source: Hip RIGHT HIP JOINT 2  Gram Stain (12 Dec 2023 00:52):    No polymorphonuclear leukocytes seen per low power field    No organisms seen per oil power field  Preliminary Report (12 Dec 2023 18:34):    No growth to date.    Culture - Joint (collected 11 Dec 2023 14:01)  Source: Hip RIGHT HIP JOINT 1  Gram Stain (12 Dec 2023 00:52):    No polymorphonuclear leukocytes seen per low power field    No organisms seen per oil power field  Preliminary Report (12 Dec 2023 18:42):    No growth to date.    Culture - Joint (collected 11 Dec 2023 14:01)  Source: Joint Fl Joint Fluid  Gram Stain (12 Dec 2023 00:54):    No polymorphonuclear leukocytes seen per low power field    No organisms seen per oil power field  Preliminary Report (12 Dec 2023 18:57):    No growth to date.      RADIOLOGY & ADDITIONAL STUDIES:          Assessment :  68YO M PMH Right Total Hip Replacement in 2006 and has been unable to lie on his right side since that time with increasing discomfort and pain. He has had arthrocentesis several weeks ago. PT denies fever chills n/v/d CP SOB. Denies fall trauma. Seen by Dr Watts outpt and revision was recommended.  Sp Partial hip revision, acetabular component only 11-Dec-2023· Surgeon noted reactive inflammatory tissue reaction.   Rule out prosthetic joint infection though suspicion is low  OR cultures NGTD  Clinically doing well    Plan :   Empiric Ancef  Fu OR cultures- if remains neg today then dc antibiotics  Trend temps and cbc  Pulm toileting  Increase activity  Stable from ID standpoint  Dc planning per primary team    Continue with present regiment.  Appropriate use of antibiotics and adverse effects reviewed.      I have discussed the above plan of care with patient in detail. He expressed understanding of the  treatment plan . Risks, benefits and alternatives discussed in detail. I have asked if he has any questions or concerns and appropriately addressed them to the best of my ability .    > 35 minutes were spent in direct patient care reviewing notes, medications ,labs data/ imaging , discussion with multidisciplinary team.    Thank you for allowing me to participate in care of your patient .    Tg Bruno MD  Infectious Disease  [phone]   [facility] patient examined and chart reviewed.       INTERVAL HPI/ OVERNIGHT EVENTS:   Afebrile. Feeling better.    PAST MEDICAL & SURGICAL HISTORY:  Osteoarthritis of right hip  Painful orthopaedic hardware  Hypertension  Hyperlipidemia  Syncopal episodes  Status post total replacement of right hip  S/P appendectomy  S/P hernia repair      For details regarding the patient's social history, family history, and other miscellaneous elements, please refer the initial infectious diseases consultation and/or the admitting history and physical examination for this admission.    ROS:  CONSTITUTIONAL:  Negative fever or chills  EYES:  Negative  blurry vision or double vision  CARDIOVASCULAR:  Negative for chest pain or palpitations  RESPIRATORY:  Negative for cough, wheezing, or SOB   GASTROINTESTINAL:  Negative for nausea, vomiting, diarrhea, constipation, or abdominal pain  GENITOURINARY:  Negative frequency, urgency or dysuria  NEUROLOGIC:  No headache, confusion, dizziness, lightheadedness  All other systems were reviewed and are negative     No Known Allergies      Current inpatient medications :    ANTIBIOTICS/RELEVANT:  ceFAZolin   IVPB      ceFAZolin   IVPB 2000 milliGRAM(s) IV Intermittent every 8 hours      MEDICATIONS  (STANDING):  acetaminophen     Tablet .. 500 milliGRAM(s) Oral every 8 hours  apixaban 2.5 milliGRAM(s) Oral every 12 hours  fenofibrate Tablet 145 milliGRAM(s) Oral at bedtime  gabapentin 300 milliGRAM(s) Oral three times a day  lactated ringers. 1000 milliLiter(s) (100 mL/Hr) IV Continuous <Continuous>  pantoprazole    Tablet 40 milliGRAM(s) Oral before breakfast  polyethylene glycol 3350 17 Gram(s) Oral at bedtime  senna 2 Tablet(s) Oral at bedtime  valsartan 160 milliGRAM(s) Oral daily    MEDICATIONS  (PRN):  HYDROmorphone   Tablet 6 milliGRAM(s) Oral every 3 hours PRN Severe Pain (7 - 10)  HYDROmorphone   Tablet 4 milliGRAM(s) Oral every 3 hours PRN Moderate Pain (4 - 6)  HYDROmorphone  Injectable 0.5 milliGRAM(s) IV Push every 3 hours PRN Breakthrough pain  magnesium hydroxide Suspension 30 milliLiter(s) Oral daily PRN Constipation  ondansetron Injectable 4 milliGRAM(s) IV Push every 6 hours PRN Nausea and/or Vomiting  zolpidem 5 milliGRAM(s) Oral at bedtime PRN Insomnia  zolpidem 5 milliGRAM(s) Oral at bedtime PRN Insomnia      Objective:  Vital Signs Last 24 Hrs  T(C): 36.9 (14 Dec 2023 07:35), Max: 36.9 (14 Dec 2023 04:30)  T(F): 98.4 (14 Dec 2023 07:35), Max: 98.4 (14 Dec 2023 04:30)  HR: 62 (14 Dec 2023 07:35) (62 - 74)  BP: 106/71 (14 Dec 2023 07:35) (97/59 - 116/69)  RR: 18 (14 Dec 2023 07:35) (18 - 18)  SpO2: 62% (14 Dec 2023 07:35) (62% - 99%)    Parameters below as of 14 Dec 2023 04:30  Patient On (Oxygen Delivery Method): room air        Physical Exam:  General: well developed well nourished, in no acute distress  Neck: supple, trachea midline  Lungs: clear, no wheeze/rhonchi  Cardiovascular: regular rate and rhythm, S1 S2  Abdomen: soft, nontender,  bowel sounds normal  Neurological: alert and oriented x3  Skin: no rash  Extremities: Right hip surgical site c/d/i          LABS:                        8.2    7.28  )-----------( 172      ( 13 Dec 2023 06:30 )             25.4   12-13    138  |  105  |  24<H>  ----------------------------<  123<H>  4.0   |  30  |  1.68<H>    Ca    9.0      13 Dec 2023 06:30    TPro  5.5<L>  /  Alb  2.7<L>  /  TBili  0.1<L>  /  DBili  0.0  /  AST  25  /  ALT  26  /  AlkPhos  33  12-13    MICROBIOLOGY:    Culture - Joint (collected 11 Dec 2023 14:01)  Source: Hip RIGHT HIP JOINT 5  Gram Stain (12 Dec 2023 00:52):    No polymorphonuclear leukocytes seen per low power field    No organisms seen per oil power field  Preliminary Report (12 Dec 2023 18:41):    No growth to date.    Culture - Joint (collected 11 Dec 2023 14:01)  Source: Hip RIGHT HIP JOINT 4  Gram Stain (12 Dec 2023 00:51):    No polymorphonuclear leukocytes seen per low power field    No organisms seen per oil power field  Preliminary Report (12 Dec 2023 18:40):    No growth to date.    Culture - Joint (collected 11 Dec 2023 14:01)  Source: Hip RIGHT HIP JOINT 3  Gram Stain (12 Dec 2023 00:53):    No polymorphonuclear leukocytes seen per low power field    No organisms seen per oil power field  Preliminary Report (12 Dec 2023 18:37):    No growth to date.    Culture - Joint (collected 11 Dec 2023 14:01)  Source: Hip RIGHT HIP JOINT 2  Gram Stain (12 Dec 2023 00:52):    No polymorphonuclear leukocytes seen per low power field    No organisms seen per oil power field  Preliminary Report (12 Dec 2023 18:34):    No growth to date.    Culture - Joint (collected 11 Dec 2023 14:01)  Source: Hip RIGHT HIP JOINT 1  Gram Stain (12 Dec 2023 00:52):    No polymorphonuclear leukocytes seen per low power field    No organisms seen per oil power field  Preliminary Report (12 Dec 2023 18:42):    No growth to date.    Culture - Joint (collected 11 Dec 2023 14:01)  Source: Joint Fl Joint Fluid  Gram Stain (12 Dec 2023 00:54):    No polymorphonuclear leukocytes seen per low power field    No organisms seen per oil power field  Preliminary Report (12 Dec 2023 18:57):    No growth to date.      RADIOLOGY & ADDITIONAL STUDIES:          Assessment :  66YO M PMH Right Total Hip Replacement in 2006 and has been unable to lie on his right side since that time with increasing discomfort and pain. He has had arthrocentesis several weeks ago. PT denies fever chills n/v/d CP SOB. Denies fall trauma. Seen by Dr Watts outpt and revision was recommended.  Sp Partial hip revision, acetabular component only 11-Dec-2023· Surgeon noted reactive inflammatory tissue reaction.   Rule out prosthetic joint infection though suspicion is low  OR cultures NGTD  Clinically doing well    Plan :   Empiric Ancef  Fu OR cultures- if remains neg today then dc antibiotics  Trend temps and cbc  Pulm toileting  Increase activity  Stable from ID standpoint  Dc planning per primary team    Continue with present regiment.  Appropriate use of antibiotics and adverse effects reviewed.      I have discussed the above plan of care with patient in detail. He expressed understanding of the  treatment plan . Risks, benefits and alternatives discussed in detail. I have asked if he has any questions or concerns and appropriately addressed them to the best of my ability .    > 35 minutes were spent in direct patient care reviewing notes, medications ,labs data/ imaging , discussion with multidisciplinary team.    Thank you for allowing me to participate in care of your patient .    Tg Bruno MD  Infectious Disease  [phone]

## 2023-12-15 VITALS
HEART RATE: 76 BPM | TEMPERATURE: 98 F | OXYGEN SATURATION: 100 % | DIASTOLIC BLOOD PRESSURE: 76 MMHG | RESPIRATION RATE: 16 BRPM | SYSTOLIC BLOOD PRESSURE: 122 MMHG

## 2023-12-15 LAB
ALBUMIN SERPL ELPH-MCNC: 3.1 G/DL — LOW (ref 3.3–5)
ALBUMIN SERPL ELPH-MCNC: 3.1 G/DL — LOW (ref 3.3–5)
ALP SERPL-CCNC: 41 U/L — SIGNIFICANT CHANGE UP (ref 30–120)
ALP SERPL-CCNC: 41 U/L — SIGNIFICANT CHANGE UP (ref 30–120)
ALT FLD-CCNC: 17 U/L — SIGNIFICANT CHANGE UP (ref 10–60)
ALT FLD-CCNC: 17 U/L — SIGNIFICANT CHANGE UP (ref 10–60)
ANION GAP SERPL CALC-SCNC: 6 MMOL/L — SIGNIFICANT CHANGE UP (ref 5–17)
ANION GAP SERPL CALC-SCNC: 6 MMOL/L — SIGNIFICANT CHANGE UP (ref 5–17)
AST SERPL-CCNC: 24 U/L — SIGNIFICANT CHANGE UP (ref 10–40)
AST SERPL-CCNC: 24 U/L — SIGNIFICANT CHANGE UP (ref 10–40)
BILIRUB SERPL-MCNC: 0.2 MG/DL — SIGNIFICANT CHANGE UP (ref 0.2–1.2)
BILIRUB SERPL-MCNC: 0.2 MG/DL — SIGNIFICANT CHANGE UP (ref 0.2–1.2)
BUN SERPL-MCNC: 23 MG/DL — SIGNIFICANT CHANGE UP (ref 7–23)
BUN SERPL-MCNC: 23 MG/DL — SIGNIFICANT CHANGE UP (ref 7–23)
CALCIUM SERPL-MCNC: 10 MG/DL — SIGNIFICANT CHANGE UP (ref 8.4–10.5)
CALCIUM SERPL-MCNC: 10 MG/DL — SIGNIFICANT CHANGE UP (ref 8.4–10.5)
CHLORIDE SERPL-SCNC: 102 MMOL/L — SIGNIFICANT CHANGE UP (ref 96–108)
CHLORIDE SERPL-SCNC: 102 MMOL/L — SIGNIFICANT CHANGE UP (ref 96–108)
CO2 SERPL-SCNC: 30 MMOL/L — SIGNIFICANT CHANGE UP (ref 22–31)
CO2 SERPL-SCNC: 30 MMOL/L — SIGNIFICANT CHANGE UP (ref 22–31)
CREAT SERPL-MCNC: 1.29 MG/DL — SIGNIFICANT CHANGE UP (ref 0.5–1.3)
CREAT SERPL-MCNC: 1.29 MG/DL — SIGNIFICANT CHANGE UP (ref 0.5–1.3)
EGFR: 61 ML/MIN/1.73M2 — SIGNIFICANT CHANGE UP
EGFR: 61 ML/MIN/1.73M2 — SIGNIFICANT CHANGE UP
GLUCOSE SERPL-MCNC: 97 MG/DL — SIGNIFICANT CHANGE UP (ref 70–99)
GLUCOSE SERPL-MCNC: 97 MG/DL — SIGNIFICANT CHANGE UP (ref 70–99)
HCT VFR BLD CALC: 27.8 % — LOW (ref 39–50)
HCT VFR BLD CALC: 27.8 % — LOW (ref 39–50)
HGB BLD-MCNC: 8.7 G/DL — LOW (ref 13–17)
HGB BLD-MCNC: 8.7 G/DL — LOW (ref 13–17)
MCHC RBC-ENTMCNC: 29.5 PG — SIGNIFICANT CHANGE UP (ref 27–34)
MCHC RBC-ENTMCNC: 29.5 PG — SIGNIFICANT CHANGE UP (ref 27–34)
MCHC RBC-ENTMCNC: 31.3 GM/DL — LOW (ref 32–36)
MCHC RBC-ENTMCNC: 31.3 GM/DL — LOW (ref 32–36)
MCV RBC AUTO: 94.2 FL — SIGNIFICANT CHANGE UP (ref 80–100)
MCV RBC AUTO: 94.2 FL — SIGNIFICANT CHANGE UP (ref 80–100)
NRBC # BLD: 0 /100 WBCS — SIGNIFICANT CHANGE UP (ref 0–0)
NRBC # BLD: 0 /100 WBCS — SIGNIFICANT CHANGE UP (ref 0–0)
PLATELET # BLD AUTO: 257 K/UL — SIGNIFICANT CHANGE UP (ref 150–400)
PLATELET # BLD AUTO: 257 K/UL — SIGNIFICANT CHANGE UP (ref 150–400)
POTASSIUM SERPL-MCNC: 4 MMOL/L — SIGNIFICANT CHANGE UP (ref 3.5–5.3)
POTASSIUM SERPL-MCNC: 4 MMOL/L — SIGNIFICANT CHANGE UP (ref 3.5–5.3)
POTASSIUM SERPL-SCNC: 4 MMOL/L — SIGNIFICANT CHANGE UP (ref 3.5–5.3)
POTASSIUM SERPL-SCNC: 4 MMOL/L — SIGNIFICANT CHANGE UP (ref 3.5–5.3)
PROT SERPL-MCNC: 6.3 G/DL — SIGNIFICANT CHANGE UP (ref 6–8.3)
PROT SERPL-MCNC: 6.3 G/DL — SIGNIFICANT CHANGE UP (ref 6–8.3)
RBC # BLD: 2.95 M/UL — LOW (ref 4.2–5.8)
RBC # BLD: 2.95 M/UL — LOW (ref 4.2–5.8)
RBC # FLD: 14.7 % — HIGH (ref 10.3–14.5)
RBC # FLD: 14.7 % — HIGH (ref 10.3–14.5)
SODIUM SERPL-SCNC: 138 MMOL/L — SIGNIFICANT CHANGE UP (ref 135–145)
SODIUM SERPL-SCNC: 138 MMOL/L — SIGNIFICANT CHANGE UP (ref 135–145)
WBC # BLD: 6.63 K/UL — SIGNIFICANT CHANGE UP (ref 3.8–10.5)
WBC # BLD: 6.63 K/UL — SIGNIFICANT CHANGE UP (ref 3.8–10.5)
WBC # FLD AUTO: 6.63 K/UL — SIGNIFICANT CHANGE UP (ref 3.8–10.5)
WBC # FLD AUTO: 6.63 K/UL — SIGNIFICANT CHANGE UP (ref 3.8–10.5)

## 2023-12-15 PROCEDURE — 99232 SBSQ HOSP IP/OBS MODERATE 35: CPT

## 2023-12-15 RX ORDER — NALOXONE HYDROCHLORIDE 4 MG/.1ML
4 SPRAY NASAL
Qty: 1 | Refills: 0
Start: 2023-12-15

## 2023-12-15 RX ORDER — APIXABAN 2.5 MG/1
1 TABLET, FILM COATED ORAL
Qty: 50 | Refills: 0
Start: 2023-12-15 | End: 2024-01-08

## 2023-12-15 RX ORDER — ACETAMINOPHEN 500 MG
2 TABLET ORAL
Refills: 0 | DISCHARGE

## 2023-12-15 RX ORDER — HYDROMORPHONE HYDROCHLORIDE 2 MG/ML
2 INJECTION INTRAMUSCULAR; INTRAVENOUS; SUBCUTANEOUS
Refills: 0 | Status: DISCONTINUED | OUTPATIENT
Start: 2023-12-15 | End: 2023-12-15

## 2023-12-15 RX ORDER — OXYCODONE HYDROCHLORIDE 5 MG/1
1 TABLET ORAL
Refills: 0 | DISCHARGE

## 2023-12-15 RX ORDER — OMEPRAZOLE 10 MG/1
1 CAPSULE, DELAYED RELEASE ORAL
Qty: 28 | Refills: 0
Start: 2023-12-15 | End: 2024-01-11

## 2023-12-15 RX ORDER — HYDROMORPHONE HYDROCHLORIDE 2 MG/ML
1 INJECTION INTRAMUSCULAR; INTRAVENOUS; SUBCUTANEOUS
Qty: 28 | Refills: 0
Start: 2023-12-15 | End: 2023-12-21

## 2023-12-15 RX ORDER — ACETAMINOPHEN 500 MG
1 TABLET ORAL
Qty: 0 | Refills: 0 | DISCHARGE
Start: 2023-12-15

## 2023-12-15 RX ORDER — HYDROMORPHONE HYDROCHLORIDE 2 MG/ML
4 INJECTION INTRAMUSCULAR; INTRAVENOUS; SUBCUTANEOUS
Refills: 0 | Status: DISCONTINUED | OUTPATIENT
Start: 2023-12-15 | End: 2023-12-15

## 2023-12-15 RX ORDER — CELECOXIB 200 MG/1
100 CAPSULE ORAL EVERY 12 HOURS
Refills: 0 | Status: DISCONTINUED | OUTPATIENT
Start: 2023-12-15 | End: 2023-12-15

## 2023-12-15 RX ORDER — ZOLPIDEM TARTRATE 10 MG/1
1 TABLET ORAL
Qty: 0 | Refills: 0 | DISCHARGE

## 2023-12-15 RX ORDER — CELECOXIB 200 MG/1
1 CAPSULE ORAL
Qty: 56 | Refills: 0
Start: 2023-12-15 | End: 2024-01-11

## 2023-12-15 RX ADMIN — Medication 500 MILLIGRAM(S): at 05:49

## 2023-12-15 RX ADMIN — PANTOPRAZOLE SODIUM 40 MILLIGRAM(S): 20 TABLET, DELAYED RELEASE ORAL at 05:44

## 2023-12-15 RX ADMIN — HYDROMORPHONE HYDROCHLORIDE 6 MILLIGRAM(S): 2 INJECTION INTRAMUSCULAR; INTRAVENOUS; SUBCUTANEOUS at 05:43

## 2023-12-15 RX ADMIN — CELECOXIB 100 MILLIGRAM(S): 200 CAPSULE ORAL at 09:15

## 2023-12-15 RX ADMIN — Medication 500 MILLIGRAM(S): at 05:43

## 2023-12-15 RX ADMIN — Medication 100 MILLIGRAM(S): at 00:11

## 2023-12-15 RX ADMIN — VALSARTAN 160 MILLIGRAM(S): 80 TABLET ORAL at 05:43

## 2023-12-15 RX ADMIN — HYDROMORPHONE HYDROCHLORIDE 4 MILLIGRAM(S): 2 INJECTION INTRAMUSCULAR; INTRAVENOUS; SUBCUTANEOUS at 13:10

## 2023-12-15 RX ADMIN — GABAPENTIN 300 MILLIGRAM(S): 400 CAPSULE ORAL at 13:10

## 2023-12-15 RX ADMIN — HYDROMORPHONE HYDROCHLORIDE 4 MILLIGRAM(S): 2 INJECTION INTRAMUSCULAR; INTRAVENOUS; SUBCUTANEOUS at 10:02

## 2023-12-15 RX ADMIN — GABAPENTIN 300 MILLIGRAM(S): 400 CAPSULE ORAL at 05:43

## 2023-12-15 RX ADMIN — Medication 100 MILLIGRAM(S): at 08:45

## 2023-12-15 RX ADMIN — APIXABAN 2.5 MILLIGRAM(S): 2.5 TABLET, FILM COATED ORAL at 08:19

## 2023-12-15 RX ADMIN — HYDROMORPHONE HYDROCHLORIDE 6 MILLIGRAM(S): 2 INJECTION INTRAMUSCULAR; INTRAVENOUS; SUBCUTANEOUS at 06:43

## 2023-12-15 NOTE — CASE MANAGEMENT PROGRESS NOTE - NSCMPROGRESSNOTE_GEN_ALL_CORE
Patient cleared for dc home today with [facility] care agency [phone] soc 12/16/2023. [facility] [phone] delivered rolling walker, Commode and patient purchased hip kit. Patients family will be available to transport patient home.  Patient cleared for dc home today with [facility] care agency [phone] soc 12/16/2023. [facility] [phone] delivered rolling walker, Commode and patient purchased hip kit. Patients family will be available to transport patient home.

## 2023-12-15 NOTE — PROGRESS NOTE ADULT - SUBJECTIVE AND OBJECTIVE BOX
POST OPERATIVE DAY #:  4  STATUS POST: Revision Right THR                       SUBJECTIVE: Patient seen and examined.  Ambulated with PT. Tolerating diet. Voiding. Patient has had a Bowel Movement. Doing much better with PT and patient expresses that he is ready to go home. Patient expressed that the new pain regimen allowed him to sleep better as well.   Reported Pain score = 6/10 and responding well to pain regimen. The dilaudid regimen is working well. Discussed in detail the need to taper the narcotic medication and dosing adjustments. Discussed multi-modal pain regimen with patient who expressed full understanding.   Denies: CP/SOB/N/V/Numbness/Tingling    OBJECTIVE:     Vital Signs Last 24 Hrs  T(C): 36.7 (15 Dec 2023 08:11), Max: 37.1 (14 Dec 2023 15:00)  T(F): 98.1 (15 Dec 2023 08:11), Max: 98.7 (14 Dec 2023 15:00)  HR: 76 (15 Dec 2023 08:11) (72 - 81)  BP: 122/76 (15 Dec 2023 08:11) (122/76 - 143/85)  RR: 16 (15 Dec 2023 08:11) (16 - 18)  SpO2: 100% (15 Dec 2023 08:11) (97% - 100%)    Parameters below as of 15 Dec 2023 08:11  Patient On (Oxygen Delivery Method): room air      Gen: AAOx3, resting in bed. pleasant affect  Abd: soft, NT, ND  Right hip:          MORIS Dressing: clean/dry/intact, flashing green/ok, no erythema or discharge noted  Bilateral LEs:         Sensation:  intact to light touch          Motor exam:  5/5 dorsiflexion/plantarflexion/EHL          2+ DP pulses          calf supple, NT         Abduction pillow in room. Patient's legs not crossed. Precautions discussed         SCDs in room, patient doing ankle pumps prior to PT arrival    LABS:                        8.7    6.63  )-----------( 257      ( 15 Dec 2023 06:30 )             27.8     12-15    138  |  102  |  23  ----------------------------<  97  4.0   |  30  |  1.29    Ca    10.0      15 Dec 2023 06:30    TPro  6.3  /  Alb  3.1<L>  /  TBili  0.2  /  DBili  x   /  AST  24  /  ALT  17  /  AlkPhos  41  12-15            MEDICATIONS:  Anticoagulation:  apixaban 2.5 milliGRAM(s) Oral every 12 hours      Pain medications:   acetaminophen     Tablet .. 500 milliGRAM(s) Oral every 8 hours  celecoxib 100 milliGRAM(s) Oral every 12 hours  gabapentin 300 milliGRAM(s) Oral three times a day  HYDROmorphone   Tablet 2 milliGRAM(s) Oral every 3 hours PRN  HYDROmorphone   Tablet 4 milliGRAM(s) Oral every 3 hours PRN  HYDROmorphone  Injectable 0.5 milliGRAM(s) IV Push every 3 hours PRN  ondansetron Injectable 4 milliGRAM(s) IV Push every 6 hours PRN  zolpidem 5 milliGRAM(s) Oral at bedtime PRN  zolpidem 5 milliGRAM(s) Oral at bedtime PRN        A/P :68y Male   s/p Revision Right THR POD # 4  -    Pain control: Acetaminophen, Dilaudid and Celebrex  -    Celebrex 100mg q 12 restarted since Creatinine function has improved. Celebrex for HO ppx.   -    Anemia: Hgb/Hct is stable and patient has remained asymptomatic. No intervention needed at this time.   -    Cryotherapy with protective skin barrier  -    Incentive Spirometry  -    DVT ppx:    Eliquis 2.5mg q 12 h and ambulation as tolerated  -    Weight bearing status: WBAT   -    Medicine Co-management  -    Continue posterior total hip precautions  -    Physical Therapy  -    Occupational Therapy  -    Discharge plan:  home today pending PT, OT, and Medicine clearance      POST OPERATIVE DAY #:  4  STATUS POST: Revision Right THR                       SUBJECTIVE: Patient seen and examined.  Ambulated with PT. Tolerating diet. Voiding. Patient has had a Bowel Movement. Doing much better with PT and patient expresses that he is ready to go home. Patient expressed that the new pain regimen allowed him to sleep better as well.   Reported Pain score = 6/10 and responding well to pain regimen. The dilaudid regimen is working well. Discussed in detail the need to taper the narcotic medication and dosing adjustments. Discussed multi-modal pain regimen with patient who expressed full understanding.   Denies: CP/SOB/N/V/Numbness/Tingling    OBJECTIVE:     Vital Signs Last 24 Hrs  T(C): 36.7 (15 Dec 2023 08:11), Max: 37.1 (14 Dec 2023 15:00)  T(F): 98.1 (15 Dec 2023 08:11), Max: 98.7 (14 Dec 2023 15:00)  HR: 76 (15 Dec 2023 08:11) (72 - 81)  BP: 122/76 (15 Dec 2023 08:11) (122/76 - 143/85)  RR: 16 (15 Dec 2023 08:11) (16 - 18)  SpO2: 100% (15 Dec 2023 08:11) (97% - 100%)    Parameters below as of 15 Dec 2023 08:11  Patient On (Oxygen Delivery Method): room air      Gen: AAOx3, resting in bed. pleasant affect  Abd: soft, NT, ND  Right hip:          MORIS Dressing: clean/dry/intact, flashing green/ok, no erythema or discharge noted  Bilateral LEs:         Sensation:  intact to light touch          Motor exam:  5/5 dorsiflexion/plantarflexion/EHL          2+ DP pulses          calf supple, NT         Abduction pillow in room. Patient's legs not crossed. Precautions discussed         SCDs in room, patient doing ankle pumps prior to PT arrival    LABS:                        8.7    6.63  )-----------( 257      ( 15 Dec 2023 06:30 )             27.8     12-15    138  |  102  |  23  ----------------------------<  97  4.0   |  30  |  1.29    Ca    10.0      15 Dec 2023 06:30    TPro  6.3  /  Alb  3.1<L>  /  TBili  0.2  /  DBili  x   /  AST  24  /  ALT  17  /  AlkPhos  41  12-15            MEDICATIONS:  Anticoagulation:  apixaban 2.5 milliGRAM(s) Oral every 12 hours      Pain medications:   acetaminophen     Tablet .. 500 milliGRAM(s) Oral every 8 hours  celecoxib 100 milliGRAM(s) Oral every 12 hours  gabapentin 300 milliGRAM(s) Oral three times a day  HYDROmorphone   Tablet 2 milliGRAM(s) Oral every 3 hours PRN  HYDROmorphone   Tablet 4 milliGRAM(s) Oral every 3 hours PRN  HYDROmorphone  Injectable 0.5 milliGRAM(s) IV Push every 3 hours PRN  zolpidem 5 milliGRAM(s) Oral at bedtime PRN  zolpidem 5 milliGRAM(s) Oral at bedtime PRN        A/P :68y Male   s/p Revision Right THR POD # 4  -    Pain control: Acetaminophen, Dilaudid and Celebrex  -    Celebrex 100mg q 12 restarted since Creatinine function has improved. Celebrex for HO ppx.   -    Anemia: Hgb/Hct is stable and patient has remained asymptomatic. No intervention needed at this time.   -    Cryotherapy with protective skin barrier  -    Incentive Spirometry  -    ID- consult. recommendations appreciated. Empiric Ancef discontinued since no growth in OR cultures per ID.   -    OR cultures: NGTD  -    Narcan at D/C due to Ambien home med and higher dosages of Narcotics Rx  -    DVT ppx:    Eliquis 2.5mg q 12 h and ambulation as tolerated  -    Weight bearing status: WBAT   -    Medicine Co-management  -    Continue posterior total hip precautions  -    Physical Therapy  -    Occupational Therapy  -    Discharge plan:  home today pending PT, OT, and Medicine clearance      POST OPERATIVE DAY #:  4  STATUS POST: Revision Right THR                       SUBJECTIVE: Patient seen and examined.  Ambulated with PT. Tolerating diet. Voiding. Patient has had a Bowel Movement. Doing much better with PT and patient expresses that he is ready to go home. Patient expressed that the new pain regimen allowed him to sleep better as well.   Reported Pain score = 6/10 and responding well to pain regimen. The dilaudid regimen is working well. Discussed in detail the need to taper the narcotic medication and dosing adjustments. Discussed multi-modal pain regimen with patient who expressed full understanding.   Denies: CP/SOB/N/V/Numbness/Tingling    OBJECTIVE:     Vital Signs Last 24 Hrs  T(C): 36.7 (15 Dec 2023 08:11), Max: 37.1 (14 Dec 2023 15:00)  T(F): 98.1 (15 Dec 2023 08:11), Max: 98.7 (14 Dec 2023 15:00)  HR: 76 (15 Dec 2023 08:11) (72 - 81)  BP: 122/76 (15 Dec 2023 08:11) (122/76 - 143/85)  RR: 16 (15 Dec 2023 08:11) (16 - 18)  SpO2: 100% (15 Dec 2023 08:11) (97% - 100%)    Parameters below as of 15 Dec 2023 08:11  Patient On (Oxygen Delivery Method): room air      Gen: AAOx3, resting in bed. pleasant affect  Abd: soft, NT, ND  Right hip:          MORIS Dressing: clean/dry/intact, flashing green/ok, no erythema or discharge noted  Bilateral LEs:         Sensation:  intact to light touch          Motor exam:  5/5 dorsiflexion/plantarflexion/EHL          2+ DP pulses          calf supple, NT         Abduction pillow in room. Patient's legs not crossed. Precautions discussed         SCDs in room, patient doing ankle pumps prior to PT arrival    LABS:                        8.7    6.63  )-----------( 257      ( 15 Dec 2023 06:30 )             27.8     12-15    138  |  102  |  23  ----------------------------<  97  4.0   |  30  |  1.29    Ca    10.0      15 Dec 2023 06:30    TPro  6.3  /  Alb  3.1<L>  /  TBili  0.2  /  DBili  x   /  AST  24  /  ALT  17  /  AlkPhos  41  12-15            MEDICATIONS:  Anticoagulation:  apixaban 2.5 milliGRAM(s) Oral every 12 hours      Pain medications:   acetaminophen     Tablet .. 500 milliGRAM(s) Oral every 8 hours  celecoxib 100 milliGRAM(s) Oral every 12 hours  gabapentin 300 milliGRAM(s) Oral three times a day  HYDROmorphone   Tablet 2 milliGRAM(s) Oral every 3 hours PRN  HYDROmorphone   Tablet 4 milliGRAM(s) Oral every 3 hours PRN  HYDROmorphone  Injectable 0.5 milliGRAM(s) IV Push every 3 hours PRN  zolpidem 5 milliGRAM(s) Oral at bedtime PRN  zolpidem 5 milliGRAM(s) Oral at bedtime PRN        A/P :68y Male   s/p Revision Right THR POD # 4  -    Pain control: Acetaminophen, Dilaudid and Celebrex  -    Celebrex 100mg q 12 restarted since Creatinine function has improved. Celebrex for HO ppx.   -    Anemia: Hgb/Hct is stable and patient has remained asymptomatic. No intervention needed at this time.   -    Cryotherapy with protective skin barrier  -    Incentive Spirometry  -    ID- consult: recommendations appreciated. Empiric Ancef discontinued since no growth in OR cultures per ID.   -    OR cultures: NGTD  -    Narcan at D/C due to Ambien home med and higher dosages of Narcotics Rx  -    DVT ppx:    Eliquis 2.5mg q 12 h and ambulation as tolerated  -    Weight bearing status: WBAT   -    Medicine Co-management  -    Continue posterior total hip precautions  -    Physical Therapy  -    Occupational Therapy  -    Discharge plan:  home today pending PT, OT, and Medicine clearance

## 2023-12-15 NOTE — SOCIAL WORK PROGRESS NOTE - NSSWPROGRESSNOTE_GEN_ALL_CORE
SW met with patient to discuss transfer to [facility] however patient has not decided he wants to go home.  SW to remain available.  SW met with patient to discuss transfer to [facility] however patient has not decided he wants to go home.  SW to remain available.

## 2023-12-15 NOTE — PROGRESS NOTE ADULT - SUBJECTIVE AND OBJECTIVE BOX
Patient is a 68y old  Male who presents with a chief complaint of left hip arthroplasty revision (12 Dec 2023 12:36)      INTERVAL HPI/OVERNIGHT EVENTS:    no [id number]    feeling well    NGTD with cultures    MEDICATIONS  (STANDING):  acetaminophen     Tablet .. 500 milliGRAM(s) Oral every 8 hours  apixaban 2.5 milliGRAM(s) Oral every 12 hours  celecoxib 100 milliGRAM(s) Oral every 12 hours  fenofibrate Tablet 145 milliGRAM(s) Oral at bedtime  gabapentin 300 milliGRAM(s) Oral three times a day  lactated ringers. 1000 milliLiter(s) (100 mL/Hr) IV Continuous <Continuous>  pantoprazole    Tablet 40 milliGRAM(s) Oral before breakfast  polyethylene glycol 3350 17 Gram(s) Oral at bedtime  senna 2 Tablet(s) Oral at bedtime  valsartan 160 milliGRAM(s) Oral daily    MEDICATIONS  (PRN):  HYDROmorphone   Tablet 2 milliGRAM(s) Oral every 3 hours PRN Moderate Pain (4 - 6)  HYDROmorphone   Tablet 4 milliGRAM(s) Oral every 3 hours PRN Severe Pain (7 - 10)  HYDROmorphone  Injectable 0.5 milliGRAM(s) IV Push every 3 hours PRN Breakthrough pain  magnesium hydroxide Suspension 30 milliLiter(s) Oral daily PRN Constipation  ondansetron Injectable 4 milliGRAM(s) IV Push every 6 hours PRN Nausea and/or Vomiting  zolpidem 5 milliGRAM(s) Oral at bedtime PRN Insomnia  zolpidem 5 milliGRAM(s) Oral at bedtime PRN Insomnia      Allergies    No Known Allergies    Intolerances        REVIEW OF SYSTEMS:  as above    Vital Signs Last 24 Hrs  T(C): 36.7 (15 Dec 2023 08:11), Max: 37.1 (14 Dec 2023 15:00)  T(F): 98.1 (15 Dec 2023 08:11), Max: 98.7 (14 Dec 2023 15:00)  HR: 76 (15 Dec 2023 08:11) (72 - 81)  BP: 122/76 (15 Dec 2023 08:11) (122/76 - 143/85)  BP(mean): --  RR: 16 (15 Dec 2023 08:11) (16 - 18)  SpO2: 100% (15 Dec 2023 08:11) (97% - 100%)    Parameters below as of 15 Dec 2023 08:11  Patient On (Oxygen Delivery Method): room air        PHYSICAL EXAM:  GENERAL: NAD, well-groomed, well-developed  HEAD:  Atraumatic, Normocephalic  EYES: EOMI, PERRLA, conjunctiva and sclera clear  ENMT: Moist mucous membranes, No lesions; No tonsillar erythema, exudates, or enlargement  NECK: Supple, No JVD, Normal thyroid  NERVOUS SYSTEM:  Alert & Oriented X3, Good concentration; All 4 extremities mobile, no gross sensory deficits.   CHEST/LUNG: Clear to auscultation bilaterally; No rales, rhonchi, wheezing, or rubs  HEART: Regular rate and rhythm; No murmurs, rubs, or gallops  ABDOMEN: Soft, Nontender, Nondistended; Bowel sounds present  EXTREMITIES:  2+ Peripheral Pulses, No clubbing, cyanosis, or edema  LYMPH: No lymphadenopathy noted  SKIN: No rashes or lesions    LABS:                        8.7    6.63  )-----------( 257      ( 15 Dec 2023 06:30 )             27.8     15 Dec 2023 06:30    138    |  102    |  23     ----------------------------<  97     4.0     |  30     |  1.29     Ca    10.0       15 Dec 2023 06:30    TPro  6.3    /  Alb  3.1    /  TBili  0.2    /  DBili  x      /  AST  24     /  ALT  17     /  AlkPhos  41     15 Dec 2023 06:30      Urinalysis Basic - ( 15 Dec 2023 06:30 )    Color: x / Appearance: x / SG: x / pH: x  Gluc: 97 mg/dL / Ketone: x  / Bili: x / Urobili: x   Blood: x / Protein: x / Nitrite: x   Leuk Esterase: x / RBC: x / WBC x   Sq Epi: x / Non Sq Epi: x / Bacteria: x      CAPILLARY BLOOD GLUCOSE          RADIOLOGY & ADDITIONAL TESTS:     Patient is a 68y old  Male who presents with a chief complaint of left hip arthroplasty revision (12 Dec 2023 12:36)      INTERVAL HPI/OVERNIGHT EVENTS:    no [id number]    feeling well    NGTD with cultures    MEDICATIONS  (STANDING):  acetaminophen     Tablet .. 500 milliGRAM(s) Oral every 8 hours  apixaban 2.5 milliGRAM(s) Oral every 12 hours  celecoxib 100 milliGRAM(s) Oral every 12 hours  fenofibrate Tablet 145 milliGRAM(s) Oral at bedtime  gabapentin 300 milliGRAM(s) Oral three times a day  lactated ringers. 1000 milliLiter(s) (100 mL/Hr) IV Continuous <Continuous>  pantoprazole    Tablet 40 milliGRAM(s) Oral before breakfast  polyethylene glycol 3350 17 Gram(s) Oral at bedtime  senna 2 Tablet(s) Oral at bedtime  valsartan 160 milliGRAM(s) Oral daily    MEDICATIONS  (PRN):  HYDROmorphone   Tablet 2 milliGRAM(s) Oral every 3 hours PRN Moderate Pain (4 - 6)  HYDROmorphone   Tablet 4 milliGRAM(s) Oral every 3 hours PRN Severe Pain (7 - 10)  HYDROmorphone  Injectable 0.5 milliGRAM(s) IV Push every 3 hours PRN Breakthrough pain  magnesium hydroxide Suspension 30 milliLiter(s) Oral daily PRN Constipation  ondansetron Injectable 4 milliGRAM(s) IV Push every 6 hours PRN Nausea and/or Vomiting  zolpidem 5 milliGRAM(s) Oral at bedtime PRN Insomnia  zolpidem 5 milliGRAM(s) Oral at bedtime PRN Insomnia      Allergies    No Known Allergies    Intolerances        REVIEW OF SYSTEMS:  as above    Vital Signs Last 24 Hrs  T(C): 36.7 (15 Dec 2023 08:11), Max: 37.1 (14 Dec 2023 15:00)  T(F): 98.1 (15 Dec 2023 08:11), Max: 98.7 (14 Dec 2023 15:00)  HR: 76 (15 Dec 2023 08:11) (72 - 81)  BP: 122/76 (15 Dec 2023 08:11) (122/76 - 143/85)  BP(mean): --  RR: 16 (15 Dec 2023 08:11) (16 - 18)  SpO2: 100% (15 Dec 2023 08:11) (97% - 100%)    Parameters below as of 15 Dec 2023 08:11  Patient On (Oxygen Delivery Method): room air        PHYSICAL EXAM:  GENERAL: NAD, well-groomed, well-developed  HEAD:  Atraumatic, Normocephalic  EYES: EOMI, PERRLA, conjunctiva and sclera clear  ENMT: Moist mucous membranes, No lesions; No tonsillar erythema, exudates, or enlargement  NECK: Supple, No JVD, Normal thyroid  NERVOUS SYSTEM:  Alert & Oriented X3, Good concentration; All 4 extremities mobile, no gross sensory deficits.   CHEST/LUNG: Clear to auscultation bilaterally; No rales, rhonchi, wheezing, or rubs  HEART: Regular rate and rhythm; No murmurs, rubs, or gallops  ABDOMEN: Soft, Nontender, Nondistended; Bowel sounds present  EXTREMITIES:  2+ Peripheral Pulses, No clubbing, cyanosis, or edema  LYMPH: No lymphadenopathy noted  SKIN: No rashes or lesions    LABS:                        8.7    6.63  )-----------( 257      ( 15 Dec 2023 06:30 )             27.8     15 Dec 2023 06:30    138    |  102    |  23     ----------------------------<  97     4.0     |  30     |  1.29     Ca    10.0       15 Dec 2023 06:30    TPro  6.3    /  Alb  3.1    /  TBili  0.2    /  DBili  x      /  AST  24     /  ALT  17     /  AlkPhos  41     15 Dec 2023 06:30      Urinalysis Basic - ( 15 Dec 2023 06:30 )    Color: x / Appearance: x / SG: x / pH: x  Gluc: 97 mg/dL / Ketone: x  / Bili: x / Urobili: x   Blood: x / Protein: x / Nitrite: x   Leuk Esterase: x / RBC: x / WBC x   Sq Epi: x / Non Sq Epi: x / Bacteria: x      CAPILLARY BLOOD GLUCOSE          RADIOLOGY & ADDITIONAL TESTS:

## 2023-12-15 NOTE — PROGRESS NOTE ADULT - PROVIDER SPECIALTY LIST ADULT
Infectious Disease
Infectious Disease
Orthopedics
Orthopedics
Hospitalist
Hospitalist
Infectious Disease
Orthopedics
Orthopedics
Hospitalist

## 2023-12-15 NOTE — PROGRESS NOTE ADULT - ASSESSMENT
67 yo male, pmh of htn, hld, OA, CKD with failed right hip arthroplasty, presenting for revision  - pain control, dvt ppx as per ortho  - s/p 2 units prbc, hgb increased to 8.2, BP now normotensive  - hold losartan/hctz  - creatinine at 1.6 no change, appears to be patient's baseline  - no medical contraindication to DC  PER ID if no growth 72hrs of culture, then may dc abx  NGTD with culture  abx dc'ed  pt to be discharged, optimized for dc   69 yo male, pmh of htn, hld, OA, CKD with failed right hip arthroplasty, presenting for revision  - pain control, dvt ppx as per ortho  - s/p 2 units prbc, hgb increased to 8.2, BP now normotensive  - hold losartan/hctz  - creatinine at 1.6 no change, appears to be patient's baseline  - no medical contraindication to DC  PER ID if no growth 72hrs of culture, then may dc abx  NGTD with culture  abx dc'ed  pt to be discharged, optimized for dc

## 2023-12-25 LAB

## 2023-12-26 ENCOUNTER — APPOINTMENT (OUTPATIENT)
Dept: ORTHOPEDIC SURGERY | Facility: CLINIC | Age: 68
End: 2023-12-26
Payer: MEDICARE

## 2023-12-26 ENCOUNTER — NON-APPOINTMENT (OUTPATIENT)
Age: 68
End: 2023-12-26

## 2023-12-26 VITALS — HEART RATE: 96 BPM | DIASTOLIC BLOOD PRESSURE: 85 MMHG | SYSTOLIC BLOOD PRESSURE: 133 MMHG

## 2023-12-26 DIAGNOSIS — Z96.641 PRESENCE OF RIGHT ARTIFICIAL HIP JOINT: ICD-10-CM

## 2023-12-26 PROCEDURE — 99024 POSTOP FOLLOW-UP VISIT: CPT

## 2023-12-26 PROCEDURE — 73502 X-RAY EXAM HIP UNI 2-3 VIEWS: CPT

## 2023-12-26 NOTE — END OF VISIT
[Time Spent: ___ minutes] : I have spent [unfilled] minutes of time on the encounter. [FreeTextEntry3] : I, Dr. Eli Watts MD, personally performed the evaluation and management services for this established patient who presented today with a new problem/exacerbation of an existing condition. That E/M includes conducting the examination, assessing all new/exacerbated conditions, and establishing a new plan of care. Today, MY ACP was here to assist with recording the history in my evaluation and management services of this new problem/exacerbated condition to be followed going forward.

## 2023-12-26 NOTE — OCCUPATIONAL THERAPY INITIAL EVALUATION ADULT - HOME MANAGEMENT SKILLS, PREVIOUS LEVEL OF FUNCTION, OT EVAL
independent Bed/Stretcher in lowest position, wheels locked, appropriate side rails in place/Call bell, personal items and telephone in reach/Instruct patient to call for assistance before getting out of bed/chair/stretcher/Non-slip footwear applied when patient is off stretcher/[place] to call system/Physically safe environment - no spills, clutter or unnecessary equipment/Purposeful proactive rounding/Room/bathroom lighting operational, light cord in reach Bed/Stretcher in lowest position, wheels locked, appropriate side rails in place/Call bell, personal items and telephone in reach/Instruct patient to call for assistance before getting out of bed/chair/stretcher/Non-slip footwear applied when patient is off stretcher/[place] to call system/Physically safe environment - no spills, clutter or unnecessary equipment/Purposeful proactive rounding/Room/bathroom lighting operational, light cord in reach

## 2023-12-26 NOTE — HISTORY OF PRESENT ILLNESS
[___ Weeks Post Op] : [unfilled] weeks post op [8] : the patient reports pain that is 8/10 in severity [Clean/Dry/Intact] : clean, dry and intact [Healed] : healed [Neuro Intact] : an unremarkable neurological exam [Vascular Intact] : ~T peripheral vascular exam normal [Negative Vikram's] : maneuvers demonstrated a negative Vikram's sign [Doing Well] : is doing well [Excellent Pain Control] : has excellent pain control [No Sign of Infection] : is showing no signs of infection [Chills] : no chills [Constipation] : no constipation [Diarrhea] : no diarrhea [Dysuria] : no dysuria [Fever] : no fever [Nausea] : no nausea [Vomiting] : no vomiting [Erythema] : not erythematous [Discharge] : absent of discharge [Swelling] : not swollen [Dehiscence] : not dehisced [de-identified] : S/p revision Right THR 12/11/23 [de-identified] : Patient is a 68-year-old male who presents today for an evaluation regarding his right hip.  He is status post a revision of a right total hip replacement which was performed approximately 17 years ago.  This is a failed metal-on-metal implant.  Overall he states he has been doing very well.  And notices an improvement over the last 2 weeks.  He states that he is slowly ambulating better.  With less discomfort.  He is receiving at home therapy.  And has been taking his medications as prescribed along with a hip precautions.  He currently ambulates with the use of a cane. [de-identified] : On physical examination of the right hip. Patient is status post right total hip replacement. There is a well-healed surgical scar. There is no redness, swelling, heat, discharge or any other evidence of infection superficial or deep. Prineo tape is properly secured to the pt, and and removed without incident. There is some mild diffuse postoperative pain and tenderness most noticeable near the incision. Along with some resolving post op ecchymosis. The patient has good passive range of motion. Able to fully extend the hip, flexion to at least 90, adequate internal rotation, external rotation as well as adduction and abduction with minimal discomfort. The patient is able to actively flex the hip by performing a slight straight leg raise as well a abduct against resistance. There is no evidence of any limb length discrepancy. No evidence of any muscle atrophy. No evidence of any palpable defect. No evidence of any motor or sensory deficit. Patient has good distal pulses with no calf tenderness. Vikram's test was negative. [de-identified] : X-rays of the right hip reveals status post right total hip replacement. In both views, the pelvis and right hip. The hardware is in place and shows excellent alignment as well as fixation. There is no evidence of limb length discrepancies. There is no evidence of any fracture, dislocation or loosening of the prosthesis. [de-identified] : Status post revision of a right total hip replacement on December 11, 2023 [de-identified] : Plan for the patient is to continue with the current management.  This includes ice packs, moist heat, leg elevation and pain medications as needed. It is suggested that the pt continue with the physical therapy as well as exercises at home. A prescription was given to the patient.  This to include walking, isometric, range of motion and strengthening exercises.  The pt is also advised to continue with the anti-inflammatories to help with pain and swelling,but mostly to prevent heterotropic bone formation. The pt was also encouraged to continue with the prescribed blood thinners to help prevent a blood clot. These were originally prescribed when discharged from the hospital. The patient is also advised to return back to the office in another 4 weeks for reevaluation and x-rays.  However, if there is any increase in pain, redness, swelling, heat, discharge or fever. The patient is advised to notify the office, sooner.  I, Dr. Watts, personally performed the evaluation and management services for this established patient who presents today with an orthopedic condition. The evaluation and management includes conducting the conditions and establishing a plan of care.  Today, my ACP Lalo Hutchinson [facility], was here to assist with the patient in my evaluation and management services for this condition which will be followed going forward.  35 minutes were spent, face to face, in direct consultation with the patient. This includes reviewing the natural history of their Dx., eliciting the history, performing an orthopedic exam, review of the x-ray findings, forming a differential Dx and discussing all treatment options. This Includes both surgical and non-surgical treatments. I also reviewed all the risks and benefits of non-operative & operative Tx options, future impact into orthopedic functions/problems, activity restrictions both at home and at work, and all follow up requirements.

## 2024-01-15 DIAGNOSIS — T56.94XD: ICD-10-CM

## 2024-01-15 DIAGNOSIS — T56.94XA: ICD-10-CM

## 2024-01-19 RX ORDER — HYDROMORPHONE HYDROCHLORIDE 4 MG/1
4 TABLET ORAL
Qty: 30 | Refills: 0 | Status: ACTIVE | COMMUNITY
Start: 2023-12-28 | End: 1900-01-01

## 2024-01-23 ENCOUNTER — APPOINTMENT (OUTPATIENT)
Dept: ORTHOPEDIC SURGERY | Facility: CLINIC | Age: 69
End: 2024-01-23
Payer: MEDICARE

## 2024-01-23 VITALS — HEIGHT: 72 IN | DIASTOLIC BLOOD PRESSURE: 77 MMHG | HEART RATE: 82 BPM | SYSTOLIC BLOOD PRESSURE: 125 MMHG

## 2024-01-23 DIAGNOSIS — R29.898 OTHER SYMPTOMS AND SIGNS INVOLVING THE MUSCULOSKELETAL SYSTEM: ICD-10-CM

## 2024-01-23 PROCEDURE — 73502 X-RAY EXAM HIP UNI 2-3 VIEWS: CPT

## 2024-01-23 PROCEDURE — 99024 POSTOP FOLLOW-UP VISIT: CPT

## 2024-01-26 RX ORDER — HYDROMORPHONE HYDROCHLORIDE 4 MG/1
4 TABLET ORAL
Qty: 30 | Refills: 0 | Status: ACTIVE | COMMUNITY
Start: 2023-12-21 | End: 1900-01-01

## 2024-01-26 NOTE — HISTORY OF PRESENT ILLNESS
[___ Weeks Post Op] : [unfilled] weeks post op [Doing Well] : is doing well [Fair Pain Control] : has fair pain control [No Sign of Infection] : is showing no signs of infection [de-identified] : S/P Revision right total hip replacement 12/11/2023 Right hip pain [de-identified] : General/Constitutional: Well appearing, non/obese  year old M in no apparent distress; height and weight as detailed below; vital signs as detailed below  Neuro: Orientation/Mental Status: Awake, alert, oriented to time, place, & person. Balance: Single leg balance fair on Left / Right @ 5 sec. Sensation: intact to fine & deep touch bilat. Feet/toes;  EHL/AT(Peroneal N.): Bilat: 5/5 ;   Vascular: DP: Bilat: 2/3 ;  Cap refill 1-2 sec. all toes Lymph: No enlarged LN in the popliteal chain bilaterally. Skin(LE): No cellulitis, edema, and min. venous varicosities bilaterally   MS: Gait: The patient has a stable [  right ] / antalgic limp using no AD . Function: There is no / min.  difficulty mounting the exam table. Leg Lengths: On exam the heels reveal [alignment] in the supine position which is confirmed at the medial malleoli.   There is [ no  ] rotatory positioning of the pelvis  The [Right] hip has no tenderness in the trochanteric bursa or the groin.  Left Hip ROM: SLR= 60 deg.; Flexion= 100 deg.; Extension= 0 deg.; Ext. Rot.= 50 deg.; Int. Rot.= 10 deg.; Abduction= 45 deg.; Adduction= 20 deg.  Right  Hip ROM: SLR= 10 deg.; Flexion= 90-95 deg.; Extension= 0 deg.; Ext. Rot.= 35-40 deg.; Int. Rot.= -5 deg.; Abduction= 40 deg.; Adduction= 20 deg.  Strength: Hip Flexor/Extensor St.= Left: 5/5 ; Right: 2/5 There is mild - mod  pain on RightROM endpoints.  There is mild stiffness on Right ROM endpoints. Stability: No gross klunk/instability with ROM bilat. Hips. Incision/scar: POst. well healed. [de-identified] : Established patient, he presents for interval office F/U post Revision Right THR as above. Reports doing well, noting slow improvements in walking & ADLs. No cane for outdoor walking. No fall or injury. Improved local swelling. Just completed Home PT. HAs Right hip pain with walking & ADLs.Pain level 6-7/10 as stated. Converted Dilaudid from Q4h to Q6h last week, picked up Rx supply last week. Awaiting OP PT Rx.   [de-identified] : XRay Pelvis and Right hip was performed in the [place] office. There is stable placement of the femoral plate and acetabular shell. No FB. No Fx, No subsidement. THe lesser trochanters aligb on [place]'s line. [de-identified] : Dr. Watts evaluated the patient, reviewed the Xrays, and is guiding her Orthopedic management. THe patient was advised to continue his HOme exervises. POsterior hip precautions may be lifted. Advised to start Outpatient PT. A RX/referral was given. Reasonable to continue Dilaudid Po for @ 1 week more then transition to TID PRN. HE will call the office once current supply near completion for refill. A new supply Rxed now will not be covered. He was advised to continue showering, Submersion azalea water after 2-3 more weeks. F/U in 4 weeks.

## 2024-01-26 NOTE — END OF VISIT
[FreeTextEntry3] : I saw and evaluated the patient. I discussed and reviewed the case details with the PA and agree with the PA's findings and plan as documented in the PA note.

## 2024-02-02 RX ORDER — HYDROMORPHONE HYDROCHLORIDE 2 MG/1
2 TABLET ORAL
Qty: 40 | Refills: 0 | Status: ACTIVE | COMMUNITY
Start: 2024-02-02 | End: 1900-01-01

## 2024-02-12 RX ORDER — HYDROMORPHONE HYDROCHLORIDE 4 MG/1
4 TABLET ORAL
Qty: 20 | Refills: 0 | Status: ACTIVE | COMMUNITY
Start: 2024-02-12 | End: 1900-01-01

## 2024-02-22 RX ORDER — HYDROMORPHONE HYDROCHLORIDE 4 MG/1
4 TABLET ORAL
Qty: 15 | Refills: 0 | Status: ACTIVE | COMMUNITY
Start: 2024-02-02 | End: 1900-01-01

## 2024-02-28 RX ORDER — HYDROMORPHONE HYDROCHLORIDE 4 MG/1
4 TABLET ORAL
Qty: 15 | Refills: 0 | Status: ACTIVE | COMMUNITY
Start: 2024-02-28 | End: 1900-01-01

## 2024-02-29 ENCOUNTER — NON-APPOINTMENT (OUTPATIENT)
Age: 69
End: 2024-02-29

## 2024-03-04 ENCOUNTER — NON-APPOINTMENT (OUTPATIENT)
Age: 69
End: 2024-03-04

## 2024-04-09 ENCOUNTER — APPOINTMENT (OUTPATIENT)
Dept: ORTHOPEDIC SURGERY | Facility: CLINIC | Age: 69
End: 2024-04-09
Payer: MEDICARE

## 2024-04-09 VITALS — DIASTOLIC BLOOD PRESSURE: 93 MMHG | SYSTOLIC BLOOD PRESSURE: 157 MMHG | HEART RATE: 80 BPM

## 2024-04-09 DIAGNOSIS — Z96.649 PRESENCE OF UNSPECIFIED ARTIFICIAL HIP JOINT: ICD-10-CM

## 2024-04-09 PROCEDURE — 99212 OFFICE O/P EST SF 10 MIN: CPT

## 2024-04-09 PROCEDURE — 73502 X-RAY EXAM HIP UNI 2-3 VIEWS: CPT | Mod: RT

## 2024-04-09 RX ORDER — LIDOCAINE HYDROCHLORIDE 10 MG/ML
1 INJECTION, SOLUTION INFILTRATION; PERINEURAL
Refills: 0 | Status: COMPLETED | OUTPATIENT
Start: 2024-04-09

## 2024-04-09 RX ORDER — METHYLPRED ACET/NACL,ISO-OS/PF 40 MG/ML
40 VIAL (ML) INJECTION
Refills: 0 | Status: COMPLETED | OUTPATIENT
Start: 2024-04-09

## 2024-04-09 RX ORDER — HYDROMORPHONE HYDROCHLORIDE 2 MG/1
2 TABLET ORAL
Qty: 20 | Refills: 0 | Status: ACTIVE | COMMUNITY
Start: 2024-04-09 | End: 1900-01-01

## 2024-04-09 RX ADMIN — LIDOCAINE HYDROCHLORIDE 3 %: 10 INJECTION, SOLUTION EPIDURAL; INFILTRATION; INTRACAUDAL; PERINEURAL at 00:00

## 2024-04-09 RX ADMIN — METHYLPREDNISOLONE ACETATE 2 MG/ML: 40 INJECTION, SUSPENSION INTRA-ARTICULAR; INTRALESIONAL; INTRAMUSCULAR; SOFT TISSUE at 00:00

## 2024-04-09 NOTE — PHYSICAL EXAM
[UE/LE] : Sensory: Intact in bilateral upper & lower extremities [ALL] : dorsalis pedis, posterior tibial, femoral, popliteal, and radial 2+ and symmetric bilaterally [Normal RUE] : Right Upper Extremity: No scars, rashes, lesions, ulcers, skin intact [Normal LUE] : Left Upper Extremity: No scars, rashes, lesions, ulcers, skin intact [Normal RLE] : Right Lower Extremity: No scars, rashes, lesions, ulcers, skin intact [Normal LLE] : Left Lower Extremity: No scars, rashes, lesions, ulcers, skin intact [Normal] : No costovertebral angle tenderness and no spinal tenderness [de-identified] : Well-healed incision to the right hip without erythema, drainage, or evidence of cellulitis.  Leg lengths appear equal on the table.  Patient has full range of motion of the right hip without pain, discomfort, or instability.  Patient can lift his leg without any difficulty.  He does have some sensitivity over the right greater trochanteric region and somewhat down the iliotibial band.  His calves are soft, nontender, no evidence of DVT at this time.  Patient is good motor and sensory to both legs. [de-identified] : Intact [de-identified] : Revision right total hip replacement, in anatomical alignment, excellent bone to prosthesis interface, there is no gross evidence of prosthetic failure, all the components are perfectly anatomically aligned.
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
## 2024-04-09 NOTE — DISCUSSION/SUMMARY
[Medication Risks Reviewed] : Medication risks reviewed [de-identified] : The patient is status post revision right total hip replacement.  He will continue an exercise program of walking, strength training.  He did request pain medication once again.  I had a lengthy conversation with the patient regarding the usage of his pain medication.  We did refer him to pain management at 1 point.  The patient is 4 months post to surgery.  Although a small prescription was written for him in the evenings and after PT we instructed the patient that we will not continue to write for narcotics.  He really should see pain management.  He understands this.  All of his questions were thoroughly answered to his satisfaction.  Will follow-up in several months.

## 2024-05-07 RX ORDER — ZOLPIDEM TARTRATE 10 MG/1
10 TABLET ORAL
Qty: 7 | Refills: 0 | Status: ACTIVE | COMMUNITY
Start: 2024-04-30 | End: 1900-01-01

## 2024-05-10 DIAGNOSIS — T56.91XA TOXIC EFFECT OF UNSPECIFIED METAL, ACCIDENTAL (UNINTENTIONAL), INITIAL ENCOUNTER: ICD-10-CM

## 2024-06-11 ENCOUNTER — APPOINTMENT (OUTPATIENT)
Dept: ORTHOPEDIC SURGERY | Facility: CLINIC | Age: 69
End: 2024-06-11

## 2024-08-19 ENCOUNTER — APPOINTMENT (OUTPATIENT)
Dept: ORTHOPEDIC SURGERY | Facility: CLINIC | Age: 69
End: 2024-08-19

## 2024-10-04 NOTE — OCCUPATIONAL THERAPY INITIAL EVALUATION ADULT - SITTING BALANCE: STATIC
Area of redness and blister noted to above IV site, Levaquin is completed infusing to the area. No SOB, resp is non labored, no other hives noted. IV removed, Ice pack appied. Pt also reported that she is seeing lights, normally she is about to get seizure. Reported that she has magnet that she rubbed it to chest area. Dr Cabrera at bedside to dgao.      Felicia Sheikh, RN  10/04/24 [id number]     good balance

## (undated) DEVICE — SUT VICRYL PLUS 1 27" CP UNDYED

## (undated) DEVICE — BAG SPONGE COUNTER EZ

## (undated) DEVICE — POSITIONER FOAM ABDUCTION PILLOW MED (PINK)

## (undated) DEVICE — DRILL BIT MICROAIRE TWIST 2.4MM X 127MM

## (undated) DEVICE — MIDAS REX MR8 METAL CUTTER DIAMOND WHEEL 25.4MM X 9CM

## (undated) DEVICE — SUT POLYSORB 2-0 30" GS-11 UNDYED

## (undated) DEVICE — HANDPIECE INTERPULSE W MULTI TIP

## (undated) DEVICE — SYR LUER LOK 50CC

## (undated) DEVICE — Device

## (undated) DEVICE — SUT ETHIBOND 1 30" OS6

## (undated) DEVICE — VENODYNE/SCD SLEEVE CALF MEDIUM

## (undated) DEVICE — SPECIMEN CONTAINER PET

## (undated) DEVICE — CANISTER SUCTION LID GUARD 3000CC

## (undated) DEVICE — SOL IRR BAG NS 0.9% 3000ML

## (undated) DEVICE — DRSG COMBINE 5X9"

## (undated) DEVICE — SYM-STRYKER TPS CONSOLE: Type: DURABLE MEDICAL EQUIPMENT

## (undated) DEVICE — ELCTR ROCKER SWITCH PENCIL BLUE 10FT

## (undated) DEVICE — SOL IRR POUR H2O 500ML

## (undated) DEVICE — POSITIONER FOAM HEAD CRADLE (PINK)

## (undated) DEVICE — SOL IRR POUR H2O 250ML

## (undated) DEVICE — SOL IRR POUR NS 0.9% 500ML

## (undated) DEVICE — HOOD FLYTE STRYKER HELMET SHIELD

## (undated) DEVICE — CONTAINER SPECIMEN 4OZ

## (undated) DEVICE — POSITIONER STRAP ARMBOARD VELCRO TS-30

## (undated) DEVICE — DRILL BIT BIOMET RINGLOCK QUICK CONNECT 3.2MM X 30MM

## (undated) DEVICE — POSITIONER STIRRUP STRAP W SLIP RING 19X3.5"

## (undated) DEVICE — SOLIDIFIER CANN EXPRESS 3K

## (undated) DEVICE — WOUND IRR IRRISEPT W 0.5 CHG

## (undated) DEVICE — SAW BLADE STRYKER SAGITTAL AGGRESSIVE 25X86.5X1.32MM

## (undated) DEVICE — WARMING BLANKET UPPER ADULT

## (undated) DEVICE — WOUND IRR SURGIPHOR

## (undated) DEVICE — NDL SPINAL 18G X 3.5" (PINK)

## (undated) DEVICE — BETADINE SCRUB BRUSH

## (undated) DEVICE — PACK TOTAL HIP

## (undated) DEVICE — ELCTR AQUAMANTYS BIPOLAR SEALER 6.0

## (undated) DEVICE — GLV 8 PROTEXIS (BLUE)

## (undated) DEVICE — SUT MONOSOF 3-0 30" C-16